# Patient Record
Sex: FEMALE | Race: WHITE | NOT HISPANIC OR LATINO | Employment: FULL TIME | URBAN - METROPOLITAN AREA
[De-identification: names, ages, dates, MRNs, and addresses within clinical notes are randomized per-mention and may not be internally consistent; named-entity substitution may affect disease eponyms.]

---

## 2017-02-12 ENCOUNTER — COMMUNICATION - HEALTHEAST (OUTPATIENT)
Dept: FAMILY MEDICINE | Facility: CLINIC | Age: 32
End: 2017-02-12

## 2017-02-13 ENCOUNTER — COMMUNICATION - HEALTHEAST (OUTPATIENT)
Dept: TELEHEALTH | Facility: CLINIC | Age: 32
End: 2017-02-13

## 2017-02-13 ENCOUNTER — OFFICE VISIT - HEALTHEAST (OUTPATIENT)
Dept: FAMILY MEDICINE | Facility: CLINIC | Age: 32
End: 2017-02-13

## 2017-02-13 DIAGNOSIS — N80.9 ENDOMETRIOSIS: ICD-10-CM

## 2017-02-13 DIAGNOSIS — E66.9 OBESITY, UNSPECIFIED: ICD-10-CM

## 2017-02-13 DIAGNOSIS — F41.1 GENERALIZED ANXIETY DISORDER: ICD-10-CM

## 2017-02-13 DIAGNOSIS — R63.5 WEIGHT GAIN: ICD-10-CM

## 2017-02-13 ASSESSMENT — MIFFLIN-ST. JEOR: SCORE: 1739.62

## 2017-02-14 ENCOUNTER — COMMUNICATION - HEALTHEAST (OUTPATIENT)
Dept: SCHEDULING | Facility: CLINIC | Age: 32
End: 2017-02-14

## 2017-02-14 DIAGNOSIS — F41.9 ANXIETY: ICD-10-CM

## 2017-03-22 ENCOUNTER — COMMUNICATION - HEALTHEAST (OUTPATIENT)
Dept: SCHEDULING | Facility: CLINIC | Age: 32
End: 2017-03-22

## 2017-03-22 DIAGNOSIS — F41.9 ANXIETY: ICD-10-CM

## 2017-03-31 ENCOUNTER — COMMUNICATION - HEALTHEAST (OUTPATIENT)
Dept: SCHEDULING | Facility: CLINIC | Age: 32
End: 2017-03-31

## 2017-04-25 ENCOUNTER — RECORDS - HEALTHEAST (OUTPATIENT)
Dept: ADMINISTRATIVE | Facility: OTHER | Age: 32
End: 2017-04-25

## 2017-05-02 ENCOUNTER — OFFICE VISIT - HEALTHEAST (OUTPATIENT)
Dept: FAMILY MEDICINE | Facility: CLINIC | Age: 32
End: 2017-05-02

## 2017-05-02 DIAGNOSIS — B02.9 HERPES ZOSTER: ICD-10-CM

## 2017-05-05 ENCOUNTER — COMMUNICATION - HEALTHEAST (OUTPATIENT)
Dept: FAMILY MEDICINE | Facility: CLINIC | Age: 32
End: 2017-05-05

## 2017-05-22 ENCOUNTER — COMMUNICATION - HEALTHEAST (OUTPATIENT)
Dept: SCHEDULING | Facility: CLINIC | Age: 32
End: 2017-05-22

## 2017-05-22 DIAGNOSIS — F41.9 ANXIETY: ICD-10-CM

## 2017-05-24 ENCOUNTER — COMMUNICATION - HEALTHEAST (OUTPATIENT)
Dept: SCHEDULING | Facility: CLINIC | Age: 32
End: 2017-05-24

## 2017-05-24 DIAGNOSIS — F41.9 ANXIETY: ICD-10-CM

## 2017-07-14 ENCOUNTER — COMMUNICATION - HEALTHEAST (OUTPATIENT)
Dept: SCHEDULING | Facility: CLINIC | Age: 32
End: 2017-07-14

## 2017-07-14 DIAGNOSIS — F41.9 ANXIETY: ICD-10-CM

## 2017-07-30 ENCOUNTER — RECORDS - HEALTHEAST (OUTPATIENT)
Dept: ADMINISTRATIVE | Facility: OTHER | Age: 32
End: 2017-07-30

## 2017-08-08 ENCOUNTER — RECORDS - HEALTHEAST (OUTPATIENT)
Dept: ADMINISTRATIVE | Facility: OTHER | Age: 32
End: 2017-08-08

## 2017-08-16 ENCOUNTER — RECORDS - HEALTHEAST (OUTPATIENT)
Dept: ADMINISTRATIVE | Facility: OTHER | Age: 32
End: 2017-08-16

## 2017-09-25 ENCOUNTER — COMMUNICATION - HEALTHEAST (OUTPATIENT)
Dept: SCHEDULING | Facility: CLINIC | Age: 32
End: 2017-09-25

## 2017-09-25 DIAGNOSIS — F41.9 ANXIETY: ICD-10-CM

## 2017-12-25 ENCOUNTER — COMMUNICATION - HEALTHEAST (OUTPATIENT)
Dept: FAMILY MEDICINE | Facility: CLINIC | Age: 32
End: 2017-12-25

## 2018-10-17 ENCOUNTER — OFFICE VISIT (OUTPATIENT)
Dept: URGENT CARE | Facility: URGENT CARE | Age: 33
End: 2018-10-17

## 2018-10-17 VITALS
RESPIRATION RATE: 15 BRPM | DIASTOLIC BLOOD PRESSURE: 82 MMHG | WEIGHT: 225 LBS | SYSTOLIC BLOOD PRESSURE: 120 MMHG | HEART RATE: 70 BPM | BODY MASS INDEX: 39.87 KG/M2 | HEIGHT: 63 IN | TEMPERATURE: 98.5 F

## 2018-10-17 DIAGNOSIS — L03.012 PARONYCHIA OF THUMB, LEFT: Primary | ICD-10-CM

## 2018-10-17 PROCEDURE — 99203 OFFICE O/P NEW LOW 30 MIN: CPT | Performed by: INTERNAL MEDICINE

## 2018-10-17 RX ORDER — NORETHINDRONE ACETATE AND ETHINYL ESTRADIOL AND FERROUS FUMARATE 5-7-9-7
1 KIT ORAL DAILY
COMMUNITY

## 2018-10-17 RX ORDER — MUPIROCIN 20 MG/G
OINTMENT TOPICAL 3 TIMES DAILY
Qty: 22 G | Refills: 0 | Status: SHIPPED | OUTPATIENT
Start: 2018-10-17 | End: 2018-10-22

## 2018-10-17 RX ORDER — CEPHALEXIN 500 MG/1
500 CAPSULE ORAL 3 TIMES DAILY
Qty: 21 CAPSULE | Refills: 0 | Status: SHIPPED | OUTPATIENT
Start: 2018-10-17 | End: 2018-10-24

## 2018-10-17 ASSESSMENT — ENCOUNTER SYMPTOMS: FEVER: 0

## 2018-10-17 NOTE — MR AVS SNAPSHOT
After Visit Summary   10/17/2018    Awilda Hloley    MRN: [id number]           Patient Information     Date Of Birth          1985        Visit Information        Provider Department      10/17/2018 8:35 PM Sammie Finch MD [facility]        Today's Diagnoses     Paronychia of thumb, left    -  1      Care Instructions            Paronychia of the Finger or Toe  Paronychia is an infection near a fingernail or toenail. It usually occurs when an opening in the cuticle or an ingrown toenail lets bacteria under the skin.  The infection will need to be drained if pus is present. If the infection has been caught early, you may need only antibiotic treatment. Healing will take about 1 to 2 weeks.  Home care  Follow these guidelines when caring for yourself at home:    Clean and soak the toe or finger. Do this 2 times a day for the first 3 days. To do so:  ? Soak your foot or hand in a tub of warm water for 5 minutes. Or hold your toe or finger under a faucet of warm running water for 5 minutes.  ? Clean any crust away with soap and water using a cotton swab.  ? Put antibiotic ointment on the infected area.    Change the dressing daily or any time it gets dirty.    If you were given antibiotics, take them as directed until they are all gone.    If your infection is on a toe, wear comfortable shoes with a lot of toe room. You can also wear open-toed sandals while your toe heals.    You may use over-the-counter medicine (acetaminophen or ibuprofen to help with pain, unless another medicine was prescribed. If you have chronic liver or kidney disease, talk with your healthcare provider before using these medicines. Also talk with your provider if you've had a stomach ulcer or GI (gastrointestinal) bleeding.  Prevention  The following can prevent paronychia:    Avoid cutting or playing with your cuticles at home.    Don't bite your nails.    Don't suck on your thumbs or  "fingers.  Follow-up care  Follow up with your healthcare provider, or as advised.  When to seek medical advice  Call your healthcare provider right away if any of these occur:    Redness, pain, or swelling of the finger or toe gets worse    Red streaks in the skin leading away from the wound    Pus or fluid draining from the nail area    Fever of 100.4 F (38 C) or higher, or as directed by your provider  Date Last Reviewed: 8/1/2016 2000-2017 [employer]. [address] [place] [address]. All rights reserved. This information is not intended as a substitute for professional medical care. Always follow your healthcare professional's instructions.                Follow-ups after your visit        Who to contact     If you have questions or need follow up information about today's clinic visit or your schedule please contact [facility] directly at [phone].  Normal or non-critical lab and imaging results will be communicated to you by MyChart, letter or phone within 4 business days after the clinic has received the results. If you do not hear from us within 7 days, please contact the clinic through MyChart or phone. If you have a critical or abnormal lab result, we will notify you by phone as soon as possible.  Submit refill requests through [email or web address] or call your pharmacy and they will forward the refill request to us. Please allow 3 business days for your refill to be completed.          Additional Information About Your Visit        Care EveryWhere ID     This is your Care EveryWhere ID. This could be used by other organizations to access your [facility] medical records  [id number]        Your Vitals Were     Pulse Temperature Respirations Height Last Period Breastfeeding?    70 98.5  F (36.9  C) (Oral) 15 5' 3\" (1.6 m) 09/26/2018 No    BMI (Body Mass Index)                   39.86 kg/m2            Blood Pressure from Last 3 Encounters:   10/17/18 120/82    Weight " from Last 3 Encounters:   10/17/18 225 lb (102.1 kg)              Today, you had the following     No orders found for display         Today's Medication Changes          These changes are accurate as of 10/17/18  9:01 PM.  If you have any questions, ask your nurse or doctor.               Start taking these medicines.        Dose/Directions    cephALEXin 500 MG capsule   Commonly known as:  KEFLEX   Used for:  Paronychia of thumb, left   Started by:  Sammie Finch MD        Dose:  500 mg   Take 1 capsule (500 mg) by mouth 3 times daily for 7 days   Quantity:  21 capsule   Refills:  0       mupirocin 2 % ointment   Commonly known as:  BACTROBAN   Used for:  Paronychia of thumb, left   Started by:  Sammie Finch MD        Apply topically 3 times daily for 5 days   Quantity:  22 g   Refills:  0            Where to get your medicines      Some of these will need a paper prescription and others can be bought over the counter.  Ask your nurse if you have questions.     Bring a paper prescription for each of these medications     cephALEXin 500 MG capsule    mupirocin 2 % ointment                Primary Care Provider    Provider Not In System                Equal Access to Services     LAVONNE HIRSCH : Hadjose rafael Ackerman, brent levin, remigio santillan, herminio guerrero. [facility] [phone].    ATENCIÓN: Si habla español, tiene a horton disposición servicios gratmarkellos de asistencia lingüística. Llame al [phone].    We comply with applicable federal civil rights laws and [place] laws. We do not discriminate on the basis of race, color, national origin, age, disability, sex, sexual orientation, or gender identity.            Thank you!     Thank you for choosing [facility]  for your care. Our goal is always to provide you with excellent care. Hearing back from our patients is one way we can continue to improve our services. Please  take a few minutes to complete the written survey that you may receive in the mail after your visit with us. Thank you!             Your Updated Medication List - Protect others around you: Learn how to safely use, store and throw away your medicines at www.disposemymeds.org.          This list is accurate as of 10/17/18  9:01 PM.  Always use your most recent med list.                   Brand Name Dispense Instructions for use Diagnosis    cephALEXin 500 MG capsule    KEFLEX    21 capsule    Take 1 capsule (500 mg) by mouth 3 times daily for 7 days    Paronychia of thumb, left       mupirocin 2 % ointment    BACTROBAN    22 g    Apply topically 3 times daily for 5 days    Paronychia of thumb, left       norethindrone-ethinyl estradiol-iron 1-20/1-30/1-35 MG-MCG per tablet    ESTROSTEP FE     Take 1 tablet by mouth daily

## 2018-10-18 NOTE — PROGRESS NOTES
"SUBJECTIVE:   Awilda Holley is a 33 year old female presenting with a chief complaint of   Chief Complaint   Patient presents with     Urgent Care     ingrown fingernail     concern of ingrown fingernail in left thumb. Very painful. feels hot.        She is a new patient of [facility].        Onset of symptoms was 5 day(s) ago.  Course of illness is worsening.      Location: Left thumb  Context: Patient used to cuticle clipper to cut off some skin on her left thumb 5 days ago as she was having some pain.  Current and Associated symptoms: redness, swelling, pain and bump started to develop  Painful to touch.  Treatment measures tried include: ice and soaking her thumb  Relief from treatment: none    Review of Systems   Constitutional: Negative for fever.       Past Medical History:   Diagnosis Date     NO ACTIVE PROBLEMS      No family history on file.  Current Outpatient Prescriptions   Medication Sig Dispense Refill     cephALEXin (KEFLEX) 500 MG capsule Take 1 capsule (500 mg) by mouth 3 times daily for 7 days 21 capsule 0     mupirocin (BACTROBAN) 2 % ointment Apply topically 3 times daily for 5 days 22 g 0     norethindrone-ethinyl estradiol-iron (ESTROSTEP FE) 1-20/1-30/1-35 MG-MCG per tablet Take 1 tablet by mouth daily       Social History   Substance Use Topics     Smoking status: Never Smoker     Smokeless tobacco: Never Used     Alcohol use Not on file       OBJECTIVE  /82  Pulse 70  Temp 98.5  F (36.9  C) (Oral)  Resp 15  Ht 5' 3\" (1.6 m)  Wt 225 lb (102.1 kg)  LMP 09/26/2018  Breastfeeding? No  BMI 39.86 kg/m2    Physical Exam   Constitutional: She appears well-developed and well-nourished.   Musculoskeletal:   Left thumb-medial left thumb nail is red swollen and tender without significant discharge.  There is no purulence or fluctuance noted   Vitals reviewed.      Labs:  No results found for this or any previous visit (from the past 24 hour(s)).        ASSESSMENT:      ICD-10-CM    1. " Paronychia of thumb, left L03.012 mupirocin (BACTROBAN) 2 % ointment     cephALEXin (KEFLEX) 500 MG capsule        PLAN:  Bactroban 3 times a day.  Soaking   if not improving fill prescription for oral antibiotics Keflex      Followup:    If not improving or if condition worsens, follow up with your Primary Care Provider    Patient Instructions           Paronychia of the Finger or Toe  Paronychia is an infection near a fingernail or toenail. It usually occurs when an opening in the cuticle or an ingrown toenail lets bacteria under the skin.  The infection will need to be drained if pus is present. If the infection has been caught early, you may need only antibiotic treatment. Healing will take about 1 to 2 weeks.  Home care  Follow these guidelines when caring for yourself at home:    Clean and soak the toe or finger. Do this 2 times a day for the first 3 days. To do so:  ? Soak your foot or hand in a tub of warm water for 5 minutes. Or hold your toe or finger under a faucet of warm running water for 5 minutes.  ? Clean any crust away with soap and water using a cotton swab.  ? Put antibiotic ointment on the infected area.    Change the dressing daily or any time it gets dirty.    If you were given antibiotics, take them as directed until they are all gone.    If your infection is on a toe, wear comfortable shoes with a lot of toe room. You can also wear open-toed sandals while your toe heals.    You may use over-the-counter medicine (acetaminophen or ibuprofen to help with pain, unless another medicine was prescribed. If you have chronic liver or kidney disease, talk with your healthcare provider before using these medicines. Also talk with your provider if you've had a stomach ulcer or GI (gastrointestinal) bleeding.  Prevention  The following can prevent paronychia:    Avoid cutting or playing with your cuticles at home.    Don't bite your nails.    Don't suck on your thumbs or fingers.  Follow-up care  Follow  up with your healthcare provider, or as advised.  When to seek medical advice  Call your healthcare provider right away if any of these occur:    Redness, pain, or swelling of the finger or toe gets worse    Red streaks in the skin leading away from the wound    Pus or fluid draining from the nail area    Fever of 100.4 F (38 C) or higher, or as directed by your provider  Date Last Reviewed: 8/1/2016 2000-2017 [employer]. [address] [place] [address]. All rights reserved. This information is not intended as a substitute for professional medical care. Always follow your healthcare professional's instructions.

## 2018-10-18 NOTE — PATIENT INSTRUCTIONS
Paronychia of the Finger or Toe  Paronychia is an infection near a fingernail or toenail. It usually occurs when an opening in the cuticle or an ingrown toenail lets bacteria under the skin.  The infection will need to be drained if pus is present. If the infection has been caught early, you may need only antibiotic treatment. Healing will take about 1 to 2 weeks.  Home care  Follow these guidelines when caring for yourself at home:    Clean and soak the toe or finger. Do this 2 times a day for the first 3 days. To do so:  ? Soak your foot or hand in a tub of warm water for 5 minutes. Or hold your toe or finger under a faucet of warm running water for 5 minutes.  ? Clean any crust away with soap and water using a cotton swab.  ? Put antibiotic ointment on the infected area.    Change the dressing daily or any time it gets dirty.    If you were given antibiotics, take them as directed until they are all gone.    If your infection is on a toe, wear comfortable shoes with a lot of toe room. You can also wear open-toed sandals while your toe heals.    You may use over-the-counter medicine (acetaminophen or ibuprofen to help with pain, unless another medicine was prescribed. If you have chronic liver or kidney disease, talk with your healthcare provider before using these medicines. Also talk with your provider if you've had a stomach ulcer or GI (gastrointestinal) bleeding.  Prevention  The following can prevent paronychia:    Avoid cutting or playing with your cuticles at home.    Don't bite your nails.    Don't suck on your thumbs or fingers.  Follow-up care  Follow up with your healthcare provider, or as advised.  When to seek medical advice  Call your healthcare provider right away if any of these occur:    Redness, pain, or swelling of the finger or toe gets worse    Red streaks in the skin leading away from the wound    Pus or fluid draining from the nail area    Fever of 100.4 F (38 C) or higher, or as  directed by your provider  Date Last Reviewed: 8/1/2016 2000-2017 [employer]. [address] [place] [address], [place] [address]. All rights reserved. This information is not intended as a substitute for professional medical care. Always follow your healthcare professional's instructions.

## 2021-05-29 ENCOUNTER — RECORDS - HEALTHEAST (OUTPATIENT)
Dept: ADMINISTRATIVE | Facility: CLINIC | Age: 36
End: 2021-05-29

## 2021-05-30 ENCOUNTER — RECORDS - HEALTHEAST (OUTPATIENT)
Dept: ADMINISTRATIVE | Facility: CLINIC | Age: 36
End: 2021-05-30

## 2021-05-30 VITALS — BODY MASS INDEX: 41.22 KG/M2 | WEIGHT: 232.7 LBS

## 2021-05-30 VITALS — HEIGHT: 63 IN | WEIGHT: 236 LBS | BODY MASS INDEX: 41.82 KG/M2

## 2021-05-31 ENCOUNTER — RECORDS - HEALTHEAST (OUTPATIENT)
Dept: ADMINISTRATIVE | Facility: CLINIC | Age: 36
End: 2021-05-31

## 2021-06-01 ENCOUNTER — RECORDS - HEALTHEAST (OUTPATIENT)
Dept: ADMINISTRATIVE | Facility: CLINIC | Age: 36
End: 2021-06-01

## 2021-06-02 ENCOUNTER — RECORDS - HEALTHEAST (OUTPATIENT)
Dept: ADMINISTRATIVE | Facility: CLINIC | Age: 36
End: 2021-06-02

## 2021-06-08 NOTE — PROGRESS NOTES
"Awilda Holley is a 31-year-old with a history of anxiety and depression obesity with endometriosis and dysmenorrhea since today for follow-up.  She continues to have a fair amount of depression/anxiety and is seeing a counselor regularly.  There has been some debate as to whether she has PTSD or not.  She continues to use the alprazolam once or twice a week and usually 30 tablets last her about 4 months.  We discussed the idea of starting a regular daily antianxiety medication and she declines.    She has continued to struggle with her endometriosis and dysmenorrhea.  She is in the process of changing to a different gynecologist and is now seeing [facility].  She would like a refill on her birth control pills that she uses as a suppression to her endometriosis.  This seems to work fairly well for her as long as she does the three-month cycling.  She has been told that she may need surgery.  She is hoping to avoid that as she is hoping to maintain her fertility.  She is concerned about further scarring.    We reviewed heart disease prevention and cancer detection.  Her weight is up and I encouraged from regular exercise.  We discussed the idea of doing further lab tests to see if there is anything more going on from a physical nature.    Objective:  Visit Vitals     /68 (Patient Site: Left Arm, Patient Position: Sitting, Cuff Size: Adult Regular)     Pulse 64     Temp 98  F (36.7  C) (Oral)     Resp 16     Ht 5' 3\" (1.6 m)     Wt (!) 236 lb (107 kg)     BMI 41.81 kg/m2     Neck is supple without lymphadenopathy thyromegaly or bruits lungs are clear heart with a regular rate and rhythm without murmur rub gallop normal S1-S2 lower extremities are free of edema skin appears normal abdomen is soft nontender obese bowel sounds positive  Please see PH Q9 and DANY forms    Labs ordered    Assessment: Anxiety/depression, endometriosis with obesity weight cane    Plan: We will call her with labs when available given a " refill on her medication and I encouraged her to continue her counseling and follow through with her gynecologist and follow-up here otherwise in 6 months or as needed

## 2021-06-10 NOTE — PROGRESS NOTES
Assessment:     1. Herpes zoster            Plan:     Herpes zoster lesions healing nicely.  She has completed a course of Valtrex.  Anticipate she will continue to feel better in terms of the pain.  Continue ibuprofen as needed for discomfort.  No additional pain medication given today.  Follow-up with her primary care physician if getting worse or not improving.    Subjective:       31 y.o. female presents for follow-up of the zoster.  She was evaluated at [facility] in [place] on 4/25/2017 for a rash on her abdomen.  She was diagnosed with shingles.  She had a lot of oozing and drainage from the shingles lesions.  She was started on valacyclovir and given some pain medication and advised to take ibuprofen as well.  She is also using some topical hydrocortisone cream and ibuprofen to the lesions.  She was told to follow-up in 1 week for recheck of how she is doing but was unable to get in with her primary care clinic.  She has been taking ibuprofen regularly for the discomfort and did take a couple of pain medications early on in her course at night to help with sleep.  She just took some ibuprofen last night and did have some difficulty with sleeping due to the discomfort.  Overall her fatigue and headache is gotten better and the rash has significantly improved.    The following portions of the patient's history were reviewed and updated as appropriate: allergies, current medications, past family history, past medical history, past social history, past surgical history and problem list.    Review of Systems  A 12 point comprehensive review of systems was negative except as noted.     Objective:      /78  Pulse 87  Temp 98.6  F (37  C) (Oral)   Wt (!) 232 lb 11.2 oz (105.6 kg)  SpO2 96%  BMI 41.22 kg/m2  General appearance: alert, appears stated age and cooperative  Abdomen: Herpes zoster rash significantly improved in fact is almost completely resolved.  no evidence of infection.    This note  has been dictated using voice recognition software. Any grammatical or context distortions are unintentional and inherent to the software

## 2022-01-13 ENCOUNTER — HOSPITAL ENCOUNTER (EMERGENCY)
Facility: CLINIC | Age: 37
Discharge: HOME | End: 2022-01-14
Attending: EMERGENCY MEDICINE | Admitting: EMERGENCY MEDICINE

## 2022-01-13 ENCOUNTER — APPOINTMENT (OUTPATIENT)
Dept: ULTRASOUND IMAGING | Facility: CLINIC | Age: 37
End: 2022-01-13
Attending: EMERGENCY MEDICINE

## 2022-01-13 DIAGNOSIS — L53.9 LEG ERYTHEMA: ICD-10-CM

## 2022-01-13 DIAGNOSIS — M79.89 LEG SWELLING: ICD-10-CM

## 2022-01-13 LAB
ALBUMIN SERPL-MCNC: 3.8 G/DL (ref 3.5–5)
ALP SERPL-CCNC: 61 U/L (ref 45–120)
ALT SERPL W P-5'-P-CCNC: 45 U/L (ref 0–45)
ANION GAP SERPL CALCULATED.3IONS-SCNC: 10 MMOL/L (ref 5–18)
AST SERPL W P-5'-P-CCNC: 39 U/L (ref 0–40)
BASOPHILS # BLD AUTO: 0 10E3/UL (ref 0–0.2)
BASOPHILS NFR BLD AUTO: 0 %
BILIRUB SERPL-MCNC: 0.3 MG/DL (ref 0–1)
BUN SERPL-MCNC: 10 MG/DL (ref 8–22)
C REACTIVE PROTEIN LHE: 1.4 MG/DL (ref 0–0.8)
CALCIUM SERPL-MCNC: 9.3 MG/DL (ref 8.5–10.5)
CHLORIDE BLD-SCNC: 102 MMOL/L (ref 98–107)
CO2 SERPL-SCNC: 28 MMOL/L (ref 22–31)
CREAT SERPL-MCNC: 0.66 MG/DL (ref 0.6–1.1)
EOSINOPHIL # BLD AUTO: 0.3 10E3/UL (ref 0–0.7)
EOSINOPHIL NFR BLD AUTO: 4 %
ERYTHROCYTE [DISTWIDTH] IN BLOOD BY AUTOMATED COUNT: 16.5 % (ref 10–15)
ERYTHROCYTE [SEDIMENTATION RATE] IN BLOOD BY WESTERGREN METHOD: 28 MM/HR (ref 0–20)
GFR SERPL CREATININE-BSD FRML MDRD: >90 ML/MIN/1.73M2
GLUCOSE BLD-MCNC: 125 MG/DL (ref 70–125)
HCT VFR BLD AUTO: 34 % (ref 35–47)
HGB BLD-MCNC: 10.1 G/DL (ref 11.7–15.7)
IMM GRANULOCYTES # BLD: 0 10E3/UL
IMM GRANULOCYTES NFR BLD: 0 %
LYMPHOCYTES # BLD AUTO: 2.4 10E3/UL (ref 0.8–5.3)
LYMPHOCYTES NFR BLD AUTO: 31 %
MCH RBC QN AUTO: 22.6 PG (ref 26.5–33)
MCHC RBC AUTO-ENTMCNC: 29.7 G/DL (ref 31.5–36.5)
MCV RBC AUTO: 76 FL (ref 78–100)
MONOCYTES # BLD AUTO: 0.6 10E3/UL (ref 0–1.3)
MONOCYTES NFR BLD AUTO: 7 %
NEUTROPHILS # BLD AUTO: 4.4 10E3/UL (ref 1.6–8.3)
NEUTROPHILS NFR BLD AUTO: 58 %
NRBC # BLD AUTO: 0 10E3/UL
NRBC BLD AUTO-RTO: 0 /100
PLATELET # BLD AUTO: 365 10E3/UL (ref 150–450)
POTASSIUM BLD-SCNC: 3.7 MMOL/L (ref 3.5–5)
PROT SERPL-MCNC: 6.8 G/DL (ref 6–8)
RBC # BLD AUTO: 4.46 10E6/UL (ref 3.8–5.2)
SODIUM SERPL-SCNC: 140 MMOL/L (ref 136–145)
WBC # BLD AUTO: 7.7 10E3/UL (ref 4–11)

## 2022-01-13 PROCEDURE — 86140 C-REACTIVE PROTEIN: CPT | Performed by: EMERGENCY MEDICINE

## 2022-01-13 PROCEDURE — 85652 RBC SED RATE AUTOMATED: CPT | Performed by: EMERGENCY MEDICINE

## 2022-01-13 PROCEDURE — 99285 EMERGENCY DEPT VISIT HI MDM: CPT | Mod: 25

## 2022-01-13 PROCEDURE — 82040 ASSAY OF SERUM ALBUMIN: CPT | Performed by: EMERGENCY MEDICINE

## 2022-01-13 PROCEDURE — 250N000011 HC RX IP 250 OP 636: Performed by: EMERGENCY MEDICINE

## 2022-01-13 PROCEDURE — 36415 COLL VENOUS BLD VENIPUNCTURE: CPT | Performed by: EMERGENCY MEDICINE

## 2022-01-13 PROCEDURE — 80053 COMPREHEN METABOLIC PANEL: CPT | Performed by: EMERGENCY MEDICINE

## 2022-01-13 PROCEDURE — 93970 EXTREMITY STUDY: CPT

## 2022-01-13 PROCEDURE — 96374 THER/PROPH/DIAG INJ IV PUSH: CPT | Mod: 59

## 2022-01-13 PROCEDURE — 85025 COMPLETE CBC W/AUTO DIFF WBC: CPT | Performed by: EMERGENCY MEDICINE

## 2022-01-13 RX ORDER — KETOROLAC TROMETHAMINE 15 MG/ML
15 INJECTION, SOLUTION INTRAMUSCULAR; INTRAVENOUS ONCE
Status: COMPLETED | OUTPATIENT
Start: 2022-01-13 | End: 2022-01-13

## 2022-01-13 RX ADMIN — KETOROLAC TROMETHAMINE 15 MG: 15 INJECTION, SOLUTION INTRAMUSCULAR; INTRAVENOUS at 22:28

## 2022-01-13 ASSESSMENT — ENCOUNTER SYMPTOMS
COLOR CHANGE: 1
MYALGIAS: 1
CONSTIPATION: 0
DIARRHEA: 0
SHORTNESS OF BREATH: 0
ROS SKIN COMMENTS: POSITIVE FOR ITCHING

## 2022-01-14 ENCOUNTER — APPOINTMENT (OUTPATIENT)
Dept: CT IMAGING | Facility: CLINIC | Age: 37
End: 2022-01-14
Attending: EMERGENCY MEDICINE

## 2022-01-14 VITALS
TEMPERATURE: 98.6 F | HEART RATE: 92 BPM | SYSTOLIC BLOOD PRESSURE: 144 MMHG | RESPIRATION RATE: 20 BRPM | DIASTOLIC BLOOD PRESSURE: 67 MMHG | OXYGEN SATURATION: 95 % | BODY MASS INDEX: 53.14 KG/M2 | WEIGHT: 293 LBS

## 2022-01-14 LAB
ALBUMIN UR-MCNC: 10 MG/DL
APPEARANCE UR: ABNORMAL
BACTERIA #/AREA URNS HPF: ABNORMAL /HPF
BILIRUB UR QL STRIP: NEGATIVE
COLOR UR AUTO: YELLOW
GLUCOSE UR STRIP-MCNC: NEGATIVE MG/DL
HCG SERPL QL: NEGATIVE
HCG UR QL: NEGATIVE
HGB UR QL STRIP: NEGATIVE
INTERNAL QC OK POCT: NORMAL
KETONES UR STRIP-MCNC: NEGATIVE MG/DL
LEUKOCYTE ESTERASE UR QL STRIP: NEGATIVE
MUCOUS THREADS #/AREA URNS LPF: PRESENT /LPF
NITRATE UR QL: NEGATIVE
PH UR STRIP: 6 [PH] (ref 5–7)
POCT KIT EXPIRATION DATE: NORMAL
POCT KIT LOT NUMBER: NORMAL
RBC URINE: 1 /HPF
SP GR UR STRIP: 1.03 (ref 1–1.03)
SQUAMOUS EPITHELIAL: 12 /HPF
UROBILINOGEN UR STRIP-MCNC: <2 MG/DL
WBC URINE: 0 /HPF

## 2022-01-14 PROCEDURE — 81025 URINE PREGNANCY TEST: CPT | Performed by: EMERGENCY MEDICINE

## 2022-01-14 PROCEDURE — 250N000011 HC RX IP 250 OP 636: Performed by: EMERGENCY MEDICINE

## 2022-01-14 PROCEDURE — 74177 CT ABD & PELVIS W/CONTRAST: CPT

## 2022-01-14 PROCEDURE — 84703 CHORIONIC GONADOTROPIN ASSAY: CPT | Performed by: EMERGENCY MEDICINE

## 2022-01-14 PROCEDURE — 36415 COLL VENOUS BLD VENIPUNCTURE: CPT | Performed by: EMERGENCY MEDICINE

## 2022-01-14 PROCEDURE — 81001 URINALYSIS AUTO W/SCOPE: CPT | Performed by: EMERGENCY MEDICINE

## 2022-01-14 RX ORDER — IOPAMIDOL 755 MG/ML
100 INJECTION, SOLUTION INTRAVASCULAR ONCE
Status: COMPLETED | OUTPATIENT
Start: 2022-01-14 | End: 2022-01-14

## 2022-01-14 RX ADMIN — IOPAMIDOL 100 ML: 755 INJECTION, SOLUTION INTRAVENOUS at 01:15

## 2022-01-14 NOTE — ED PROVIDER NOTES
EMERGENCY DEPARTMENT ENCOUNTER      NAME: Awilda Holley  AGE: 36 year old female  YOB: 1985  MRN: [id number]  EVALUATION DATE & TIME: 1/13/2022  9:56 PM    PCP: No primary care provider on file.    ED PROVIDER: Shade Warren M.D.      Chief Complaint   Patient presents with     Leg Swelling         FINAL IMPRESSION:  1. Leg swelling    2. Leg erythema          ED COURSE & MEDICAL DECISION MAKING:    Pertinent Labs & Imaging studies reviewed. (See chart for details)  36 year old female presents to the Emergency Department for evaluation of bilateral leg swelling and redness.  Patient recently on antibiotics for tooth infection.  Was on clindamycin and Levaquin.  Now having worsening leg swelling pain and redness.  They are not particularly warm.  Seems unlikely cellulitis.  To do ultrasounds no signs of DVT.  Had some abdominal pains also did a CT scan looking in the abdomen for any signs of obstruction of her venous return.  None is seen.  No other cause of her pain is seen.  Patient has a normal white count.  Mildly elevated sed rate and CRP.  Normal kidney and liver function.  Mild amount of protein in her urine of unclear significance.  I do not think this is Jerome-Javed syndrome.  She has no blistering or open wounds.  Does not seem to be consistent with that.  However we will have her follow-up with her primary in the next few days.  Return for any worsening symptoms.  She is advised to elevate her legs use ibuprofen for pain addition to compression stockings.  Patient states understanding and is discharged home.    10:05 PM I met with the patient to gather history and to perform my initial exam. I discussed the plan for care while in the Emergency Department. PPE: Provider wore gloves, mask, eye protection, and paper mask.      At the conclusion of the encounter I discussed the results of all of the tests and the disposition. The questions were answered. The patient or family acknowledged  understanding and was agreeable with the care plan.           MEDICATIONS GIVEN IN THE EMERGENCY:  Medications   ketorolac (TORADOL) injection 15 mg (15 mg Intravenous Given 1/13/22 2228)   iopamidol (ISOVUE-370) solution 100 mL (100 mLs Intravenous Given 1/14/22 0115)       NEW PRESCRIPTIONS STARTED AT TODAY'S ER VISIT  New Prescriptions    No medications on file          =================================================================    HPI    Patient information was obtained from: The patient    Use of : N/A       Awilda Holley is a 36 year old female with a pertinent history of generalized anxiety disorder who presents to this ED via walk-in for evaluation of leg swelling and pain.    The patient reports that 3 days ago she developed pain to her bilateral legs with associated swelling, erythema, itching, and warmth to the touch. Since onset her pain has progressively worsened. She also notes some pain to her suprapubic area.     Of note, slightly over a month ago the patient had her wisdom tooth pulled and it turned out to be slightly complicated and she would need to see an oral surgeon. Then, around [place] (~20 days ago) while she was in [place] she developed an infection and had to be hospitalized. While hospitalized she also contracted COVID-19. The patient was put on antibiotics and just finished her course the day before her leg symptoms began. She was taking Clindamycin and Levaquin. The patient denies any regular daily medications. Denies any chance of pregnancy. Denies diarrhea, constipation, chest pain, shortness of breath, and any other symptoms or complaints at this time.       REVIEW OF SYSTEMS   Review of Systems   Respiratory: Negative for shortness of breath.    Cardiovascular: Positive for leg swelling. Negative for chest pain.   Gastrointestinal: Negative for constipation and diarrhea.   Musculoskeletal: Positive for myalgias.   Skin: Positive for color change (redness).         Positive for itching   All other systems reviewed and are negative.       PAST MEDICAL HISTORY:  Past Medical History:   Diagnosis Date     NO ACTIVE PROBLEMS        PAST SURGICAL HISTORY:  Past Surgical History:   Procedure Laterality Date     SD LAP,ABDOMEN,ASPIRATE CYST      Description: Gynecologic Laparoscopy With Aspiration;  Proc Date: 2009;  Comments: with removal ovarian cysts     [facility]  DELIVERY ONLY      Description:  Section Low Transverse;  Proc Date: 2007;     ZZC LAP,CHOLECYSTECTOMY/EXPLORE      Description: Cholecystectomy Laparoscopic;  Proc Date: 2009;           CURRENT MEDICATIONS:    No current facility-administered medications for this encounter.     Current Outpatient Medications   Medication     norethindrone-ethinyl estradiol-iron (ESTROSTEP FE) 1-20/1-30/1-35 MG-MCG per tablet         ALLERGIES:  Allergies   Allergen Reactions     Pcn [Penicillins]      Tylenol [Acetaminophen]        FAMILY HISTORY:  History reviewed. No pertinent family history.    SOCIAL HISTORY:   Social History     Socioeconomic History     Marital status: Single     Spouse name: Not on file     Number of children: Not on file     Years of education: Not on file     Highest education level: Not on file   Occupational History     Not on file   Tobacco Use     Smoking status: Never Smoker     Smokeless tobacco: Never Used   Substance and Sexual Activity     Alcohol use: Not on file     Drug use: Not on file     Sexual activity: Not on file   Other Topics Concern     Not on file   Social History Narrative     Not on file     Social Determinants of Health     Financial Resource Strain: Not on file   Food Insecurity: Not on file   Transportation Needs: Not on file   Physical Activity: Not on file   Stress: Not on file   Social Connections: Not on file   Intimate Partner Violence: Not on file   Housing Stability: Not on file       VITALS:  BP (!) 144/67   Pulse 92   Temp 98.6  F (37  C)  (Oral)   Resp 20   Wt 136.1 kg (300 lb)   SpO2 95%   BMI 53.14 kg/m      PHYSICAL EXAM    Physical Exam  Constitutional:       General: She is not in acute distress.     Appearance: She is not diaphoretic.   HENT:      Head: Atraumatic.      Mouth/Throat:      Pharynx: No oropharyngeal exudate.   Eyes:      General: No scleral icterus.     Pupils: Pupils are equal, round, and reactive to light.   Cardiovascular:      Heart sounds: Normal heart sounds.   Pulmonary:      Effort: No respiratory distress.      Breath sounds: Normal breath sounds.   Abdominal:      Palpations: Abdomen is soft.      Tenderness: There is abdominal tenderness (mid lower abdomen). There is no guarding or rebound.   Musculoskeletal:         General: No tenderness.      Right lower leg: Edema present.      Left lower leg: Edema present.   Skin:     General: Skin is warm.      Findings: Erythema and rash present.      Comments: 2+ pedal edema.  There is erythema to the mid shin on both sides.  Good pulses.  No blistering.  No open wounds.   Neurological:      General: No focal deficit present.      Mental Status: She is alert.           LAB:  All pertinent labs reviewed and interpreted.  Labs Ordered and Resulted from Time of ED Arrival to Time of ED Departure   ROUTINE UA WITH MICROSCOPIC REFLEX TO CULTURE - Abnormal       Result Value    Color Urine Yellow      Appearance Urine Slightly Cloudy (*)     Glucose Urine Negative      Bilirubin Urine Negative      Ketones Urine Negative      Specific Gravity Urine 1.026      Blood Urine Negative      pH Urine 6.0      Protein Albumin Urine 10  (*)     Urobilinogen Urine <2.0      Nitrite Urine Negative      Leukocyte Esterase Urine Negative      Bacteria Urine Few (*)     Mucus Urine Present (*)     RBC Urine 1      WBC Urine 0      Squamous Epithelials Urine 12 (*)    CRP INFLAMMATION - Abnormal    CRP 1.4 (*)    ERYTHROCYTE SEDIMENTATION RATE AUTO - Abnormal    Erythrocyte Sedimentation Rate  28 (*)    CBC WITH PLATELETS AND DIFFERENTIAL - Abnormal    WBC Count 7.7      RBC Count 4.46      Hemoglobin 10.1 (*)     Hematocrit 34.0 (*)     MCV 76 (*)     MCH 22.6 (*)     MCHC 29.7 (*)     RDW 16.5 (*)     Platelet Count 365      % Neutrophils 58      % Lymphocytes 31      % Monocytes 7      % Eosinophils 4      % Basophils 0      % Immature Granulocytes 0      NRBCs per 100 WBC 0      Absolute Neutrophils 4.4      Absolute Lymphocytes 2.4      Absolute Monocytes 0.6      Absolute Eosinophils 0.3      Absolute Basophils 0.0      Absolute Immature Granulocytes 0.0      Absolute NRBCs 0.0     COMPREHENSIVE METABOLIC PANEL - Normal    Sodium 140      Potassium 3.7      Chloride 102      Carbon Dioxide (CO2) 28      Anion Gap 10      Urea Nitrogen 10      Creatinine 0.66      Calcium 9.3      Glucose 125      Alkaline Phosphatase 61      AST 39      ALT 45      Protein Total 6.8      Albumin 3.8      Bilirubin Total 0.3      GFR Estimate >90     HCG QUALITATIVE PREGNANCY - Normal    hCG Serum Qualitative Negative     HCG QUALITATIVE URINE POCT - Normal    HCG Qual Urine Negative      Internal QC Check POCT Valid      POCT Kit Lot Number [id number]      POCT Kit Expiration Date 3/31/2023         RADIOLOGY:  Reviewed all pertinent imaging. Please see official radiology report.  CT Abdomen Pelvis w Contrast   Final Result   IMPRESSION:    1.  No definite acute abnormalities or CT findings to explain the patient's symptoms.      2.  Mild diffuse fatty infiltration of the liver.      3.  Cholecystectomy. No biliary dilatation.      4.  Large amount of stool in the colon. No evidence for obstruction or acute bowel findings.      US Lower Extremity Venous Duplex Bilateral   Final Result   IMPRESSION:   1.  No deep venous thrombosis in the bilateral lower extremities.              I, Chapo Villeda, am serving as a scribe to document services personally performed by Dr. Shade Warren, based on my observation and the  provider's statements to me. I, Shade Warren MD attest that Chapo Villeda is acting in a scribe capacity, has observed my performance of the services and has documented them in accordance with my direction.    Shade Warren M.D.  Emergency Medicine  [facility] EMERGENCY ROOM  [address] [facility] [address]  [phone]  Dept: [phone]     Shade Warren MD  01/14/22 0210

## 2022-01-14 NOTE — ED TRIAGE NOTES
Noticed redness, swelling, and pain to bilateral lower extremities a few days ago. No fevers noted at home. States legs are warm to touch and feel tight. No meds PTA.  Pt was recently hospitalized in [place] for tooth infection and covid + at that time.

## 2022-09-10 ENCOUNTER — HEALTH MAINTENANCE LETTER (OUTPATIENT)
Age: 37
End: 2022-09-10

## 2023-10-01 ENCOUNTER — HEALTH MAINTENANCE LETTER (OUTPATIENT)
Age: 38
End: 2023-10-01

## 2024-02-05 ENCOUNTER — HOSPITAL ENCOUNTER (EMERGENCY)
Facility: CLINIC | Age: 39
Discharge: HOME | End: 2024-02-05
Attending: EMERGENCY MEDICINE | Admitting: EMERGENCY MEDICINE
Payer: COMMERCIAL

## 2024-02-05 VITALS
SYSTOLIC BLOOD PRESSURE: 157 MMHG | OXYGEN SATURATION: 92 % | TEMPERATURE: 97.7 F | HEART RATE: 88 BPM | RESPIRATION RATE: 17 BRPM | DIASTOLIC BLOOD PRESSURE: 74 MMHG

## 2024-02-05 DIAGNOSIS — J06.9 VIRAL URI: ICD-10-CM

## 2024-02-05 LAB
ATRIAL RATE - MUSE: 104 BPM
DIASTOLIC BLOOD PRESSURE - MUSE: 103 MMHG
FLUAV RNA SPEC QL NAA+PROBE: NEGATIVE
FLUBV RNA RESP QL NAA+PROBE: NEGATIVE
INTERPRETATION ECG - MUSE: NORMAL
P AXIS - MUSE: 50 DEGREES
PR INTERVAL - MUSE: 170 MS
QRS DURATION - MUSE: 90 MS
QT - MUSE: 378 MS
QTC - MUSE: 497 MS
R AXIS - MUSE: 34 DEGREES
RSV RNA SPEC NAA+PROBE: NEGATIVE
SARS-COV-2 RNA RESP QL NAA+PROBE: NEGATIVE
SYSTOLIC BLOOD PRESSURE - MUSE: 186 MMHG
T AXIS - MUSE: 66 DEGREES
VENTRICULAR RATE- MUSE: 104 BPM

## 2024-02-05 PROCEDURE — 93005 ELECTROCARDIOGRAM TRACING: CPT | Performed by: EMERGENCY MEDICINE

## 2024-02-05 PROCEDURE — 87637 SARSCOV2&INF A&B&RSV AMP PRB: CPT | Performed by: EMERGENCY MEDICINE

## 2024-02-05 PROCEDURE — 99284 EMERGENCY DEPT VISIT MOD MDM: CPT

## 2024-02-05 ASSESSMENT — ACTIVITIES OF DAILY LIVING (ADL): ADLS_ACUITY_SCORE: 35

## 2024-02-05 NOTE — Clinical Note
Awilda Holley was seen and treated in our emergency department on 2/5/2024.  She may return to work on 02/07/2024.       If you have any questions or concerns, please don't hesitate to call.      Shade Warren MD

## 2024-02-05 NOTE — ED TRIAGE NOTES
Patient arrives after being seen in urgent care today for sore throat, SOB, and low oxygen saturation (per patient). Patient states after being seen in urgent care she went home with a pulse ox to keep an eye on it. She noticed her O2 dropping to 93-95% and became worrisome. Patient endorses throat pain 4/10, SOB upon exertion and a diagnosis of pink eye today at urgent care. Patient denies chest pain, abdomin pain, N/V, headache, etc. Patient took ibuprofen at 7:30PM tonight. Partner, Mino, at bedside.      Triage Assessment (Adult)       Row Name 02/05/24 0104          Triage Assessment    Airway WDL WDL        Respiratory WDL    Respiratory WDL X;rhythm/pattern     Rhythm/Pattern, Respiratory shortness of breath;dyspnea upon exertion        Skin Circulation/Temperature WDL    Skin Circulation/Temperature WDL WDL        Cardiac WDL    Cardiac WDL rhythm     Pulse Rate & Regularity tachycardic        Peripheral/Neurovascular WDL    Peripheral Neurovascular WDL WDL        Cognitive/Neuro/Behavioral WDL    Cognitive/Neuro/Behavioral WDL WDL

## 2024-02-05 NOTE — ED PROVIDER NOTES
EMERGENCY DEPARTMENT ENCOUNTER      NAME: Awilda Holley  AGE: 38 year old female  YOB: 1985  MRN: [id number]  EVALUATION DATE & TIME: 2/5/2024  1:00 AM    PCP: System, Provider Not In    ED PROVIDER: Shade Warren M.D.      Chief Complaint   Patient presents with    Pharyngitis         FINAL IMPRESSION:  1. Viral URI          ED COURSE & MEDICAL DECISION MAKING:    Pertinent Labs & Imaging studies reviewed. (See chart for details)  38 year old female presents to the Emergency Department for evaluation of upper respiratory symptoms.  Concerned that her oxygen saturation was low.  Is only as low as 93 at home.  Does have some degree of sleep apnea based on her history.  This is normal.  Lungs are clear.  Did do COVID and flu RSV and these are all negative.  Oxygen saturation normal throughout her visit here.  I do not think this is cardiac.  No signs of PE.  I do not think she needs further workup.  Will discharge home.    1:02 AM I met with the patient to gather history and to perform my initial exam. I discussed the plan for care while in the Emergency Department.       At the conclusion of the encounter I discussed the results of all of the tests and the disposition. The questions were answered. The patient or family acknowledged understanding and was agreeable with the care plan.     Medical Decision Making  Obtained supplemental history:Supplemental history obtained?: Documented in chart  Reviewed external records: External records reviewed?: Documented in chart and Outpatient Record: [facility] on 02/04/2024  Care impacted by chronic illness:N/A  Care significantly affected by social determinants of health:Access to Medical Care  Did you consider but not order tests?: Work up considered but not performed and documented in chart, if applicable  Did you interpret images independently?: Independent interpretation of ECG and images noted in documentation, when  applicable.  Consultation discussion with other provider:Did you involve another provider (consultant, , pharmacy, etc.)?: No  Discharge. No recommendations on prescription strength medication(s). See documentation for any additional details.         MEDICATIONS GIVEN IN THE EMERGENCY:  Medications - No data to display    NEW PRESCRIPTIONS STARTED AT TODAY'S ER VISIT  Discharge Medication List as of 2/5/2024  2:30 AM             =================================================================    HPI    Patient information was obtained from: The patient    Use of : N/A         Awilda Holley is a 38 year old female with no pertinent history who presents to this ED for evaluation of sore throat and low oxygen.    Per chart review, the patient was seen at [facility] on 02/04/2024  for an evaluation of sore throat, loss of voice, shortness of breath, and eye redness and drainage. She tested negative for COVID at home. No personal history of COPD or asthma. Patient was hypertensive in clinic. Her O2 sats were wavering between 97% and 94%, with her lowest O2 Sats at 93% one time. Rapid strep test came back negative. She was discharged with antibiotic eyedrops and recommended to be seen in the ER if her O2 sats lower in the 90s.    The patient was seen at Urgent Care yesterday for sore throat. Her strep test came back negative but her clinician was worried of her low O2 sats and recommended to be seen in the ER if her O2 sats went below 90 %. Her O2 did go below 90%. She is mildly short of breath and coughing. She does not partake in any tobacco use or any chronic lung history.    Otherwise, the patient denied having chest pain,  and any other medical complaints or concerns at this time.        PAST MEDICAL HISTORY:  Past Medical History:   Diagnosis Date    NO ACTIVE PROBLEMS        PAST SURGICAL HISTORY:  Past Surgical History:   Procedure Laterality Date    IN LAP,ABDOMEN,ASPIRATE CYST       Description: Gynecologic Laparoscopy With Aspiration;  Proc Date: 2009;  Comments: with removal ovarian cysts    [facility]  DELIVERY ONLY      Description:  Section Low Transverse;  Proc Date: 2007;    ZC LAP,CHOLECYSTECTOMY/EXPLORE      Description: Cholecystectomy Laparoscopic;  Proc Date: 2009;           CURRENT MEDICATIONS:    No current facility-administered medications for this encounter.     Current Outpatient Medications   Medication    norethindrone-ethinyl estradiol-iron (ESTROSTEP FE) 1-20/1-30/1-35 MG-MCG per tablet         ALLERGIES:  Allergies   Allergen Reactions    Pcn [Penicillins]     Tylenol [Acetaminophen]        FAMILY HISTORY:  No family history on file.    SOCIAL HISTORY:   Social History     Socioeconomic History    Marital status: Single   Tobacco Use    Smoking status: Never    Smokeless tobacco: Never       VITALS:  BP (!) 157/74   Pulse 88   Temp 97.7  F (36.5  C) (Oral)   Resp 17   SpO2 92%     PHYSICAL EXAM    Physical Exam  Vitals and nursing note reviewed.   Constitutional:       General: She is not in acute distress.     Appearance: She is not diaphoretic.   HENT:      Head: Atraumatic.      Mouth/Throat:      Pharynx: No oropharyngeal exudate.   Eyes:      General: No scleral icterus.     Pupils: Pupils are equal, round, and reactive to light.   Cardiovascular:      Rate and Rhythm: Normal rate and regular rhythm.      Heart sounds: Normal heart sounds.   Pulmonary:      Effort: No respiratory distress.      Breath sounds: Normal breath sounds.   Abdominal:      Palpations: Abdomen is soft.      Tenderness: There is no abdominal tenderness. There is no guarding or rebound. Negative signs include Akhtar's sign.   Musculoskeletal:         General: No tenderness.   Skin:     General: Skin is warm.      Findings: No rash.   Neurological:      General: No focal deficit present.      Mental Status: She is alert.           LAB:  All pertinent labs  reviewed and interpreted.  Labs Ordered and Resulted from Time of ED Arrival to Time of ED Departure   INFLUENZA A/B, RSV, & SARS-COV2 PCR - Normal       Result Value    Influenza A PCR Negative      Influenza B PCR Negative      RSV PCR Negative      SARS CoV2 PCR Negative         RADIOLOGY:  Reviewed all pertinent imaging. Please see official radiology report.  No orders to display       EKG:    Time: 108  Impression: Sinus tachycardia.  Otherwise normal.  No change from previous dated May 4, 2014.  Sinus tachycardia rate of 104.  .  QRS 90.  QTc 497.  Above EKG is reviewed interpreted by myself    I have independently reviewed and interpreted the EKG(s) documented above.    PROCEDURES:   None      I, Quentin Sumner, am serving as a scribe to document services personally performed by Dr. Shade Warren, based on my observation and the provider's statements to me. I, Shade Warren MD attest that Quentin Sumner is acting in a scribe capacity, has observed my performance of the services and has documented them in accordance with my direction.    Shade Warren M.D.  Emergency Medicine  [facility] EMERGENCY ROOM  [address] [id number] [phone]  Dept: [phone]       Shade Warren MD  02/05/24 1711

## 2024-11-13 ENCOUNTER — APPOINTMENT (OUTPATIENT)
Dept: CT IMAGING | Facility: CLINIC | Age: 39
End: 2024-11-13
Attending: EMERGENCY MEDICINE

## 2024-11-13 ENCOUNTER — HOSPITAL ENCOUNTER (EMERGENCY)
Facility: CLINIC | Age: 39
Discharge: HOME | End: 2024-11-13
Attending: EMERGENCY MEDICINE | Admitting: EMERGENCY MEDICINE

## 2024-11-13 VITALS
HEART RATE: 107 BPM | DIASTOLIC BLOOD PRESSURE: 86 MMHG | OXYGEN SATURATION: 92 % | HEIGHT: 63 IN | SYSTOLIC BLOOD PRESSURE: 138 MMHG | BODY MASS INDEX: 51.91 KG/M2 | TEMPERATURE: 97.7 F | WEIGHT: 293 LBS | RESPIRATION RATE: 18 BRPM

## 2024-11-13 DIAGNOSIS — L02.01 FACIAL ABSCESS: ICD-10-CM

## 2024-11-13 LAB
ANION GAP SERPL CALCULATED.3IONS-SCNC: 13 MMOL/L (ref 7–15)
BASOPHILS # BLD AUTO: 0 10E3/UL (ref 0–0.2)
BASOPHILS NFR BLD AUTO: 0 %
BUN SERPL-MCNC: 13.5 MG/DL (ref 6–20)
CALCIUM SERPL-MCNC: 9.6 MG/DL (ref 8.8–10.4)
CHLORIDE SERPL-SCNC: 97 MMOL/L (ref 98–107)
CREAT SERPL-MCNC: 0.78 MG/DL (ref 0.51–0.95)
EGFRCR SERPLBLD CKD-EPI 2021: >90 ML/MIN/1.73M2
EOSINOPHIL # BLD AUTO: 0.3 10E3/UL (ref 0–0.7)
EOSINOPHIL NFR BLD AUTO: 3 %
ERYTHROCYTE [DISTWIDTH] IN BLOOD BY AUTOMATED COUNT: 13.2 % (ref 10–15)
GLUCOSE SERPL-MCNC: 135 MG/DL (ref 70–99)
HCG SERPL QL: NEGATIVE
HCO3 SERPL-SCNC: 29 MMOL/L (ref 22–29)
HCT VFR BLD AUTO: 42.1 % (ref 35–47)
HGB BLD-MCNC: 13.6 G/DL (ref 11.7–15.7)
IMM GRANULOCYTES # BLD: 0.1 10E3/UL
IMM GRANULOCYTES NFR BLD: 0 %
LYMPHOCYTES # BLD AUTO: 2.9 10E3/UL (ref 0.8–5.3)
LYMPHOCYTES NFR BLD AUTO: 25 %
MCH RBC QN AUTO: 26.2 PG (ref 26.5–33)
MCHC RBC AUTO-ENTMCNC: 32.3 G/DL (ref 31.5–36.5)
MCV RBC AUTO: 81 FL (ref 78–100)
MONOCYTES # BLD AUTO: 0.6 10E3/UL (ref 0–1.3)
MONOCYTES NFR BLD AUTO: 5 %
NEUTROPHILS # BLD AUTO: 7.6 10E3/UL (ref 1.6–8.3)
NEUTROPHILS NFR BLD AUTO: 66 %
NRBC # BLD AUTO: 0 10E3/UL
NRBC BLD AUTO-RTO: 0 /100
PLATELET # BLD AUTO: 392 10E3/UL (ref 150–450)
POTASSIUM SERPL-SCNC: 3.6 MMOL/L (ref 3.4–5.3)
RBC # BLD AUTO: 5.19 10E6/UL (ref 3.8–5.2)
SODIUM SERPL-SCNC: 139 MMOL/L (ref 135–145)
WBC # BLD AUTO: 11.5 10E3/UL (ref 4–11)

## 2024-11-13 PROCEDURE — 85018 HEMOGLOBIN: CPT | Performed by: EMERGENCY MEDICINE

## 2024-11-13 PROCEDURE — 70491 CT SOFT TISSUE NECK W/DYE: CPT

## 2024-11-13 PROCEDURE — 82435 ASSAY OF BLOOD CHLORIDE: CPT | Performed by: EMERGENCY MEDICINE

## 2024-11-13 PROCEDURE — 87205 SMEAR GRAM STAIN: CPT | Performed by: EMERGENCY MEDICINE

## 2024-11-13 PROCEDURE — 250N000013 HC RX MED GY IP 250 OP 250 PS 637: Performed by: EMERGENCY MEDICINE

## 2024-11-13 PROCEDURE — 80048 BASIC METABOLIC PNL TOTAL CA: CPT | Performed by: EMERGENCY MEDICINE

## 2024-11-13 PROCEDURE — 85004 AUTOMATED DIFF WBC COUNT: CPT | Performed by: EMERGENCY MEDICINE

## 2024-11-13 PROCEDURE — 36415 COLL VENOUS BLD VENIPUNCTURE: CPT | Performed by: EMERGENCY MEDICINE

## 2024-11-13 PROCEDURE — 84703 CHORIONIC GONADOTROPIN ASSAY: CPT | Performed by: EMERGENCY MEDICINE

## 2024-11-13 PROCEDURE — 250N000011 HC RX IP 250 OP 636: Performed by: EMERGENCY MEDICINE

## 2024-11-13 PROCEDURE — 99285 EMERGENCY DEPT VISIT HI MDM: CPT | Mod: 25

## 2024-11-13 RX ORDER — LEVOFLOXACIN 5 MG/ML
500 INJECTION, SOLUTION INTRAVENOUS ONCE
Status: DISCONTINUED | OUTPATIENT
Start: 2024-11-13 | End: 2024-11-13

## 2024-11-13 RX ORDER — OXYCODONE HYDROCHLORIDE 5 MG/1
5 TABLET ORAL EVERY 6 HOURS PRN
Qty: 12 TABLET | Refills: 0 | Status: SHIPPED | OUTPATIENT
Start: 2024-11-13 | End: 2024-11-16

## 2024-11-13 RX ORDER — IOPAMIDOL 755 MG/ML
90 INJECTION, SOLUTION INTRAVASCULAR ONCE
Status: COMPLETED | OUTPATIENT
Start: 2024-11-13 | End: 2024-11-13

## 2024-11-13 RX ORDER — LEVOFLOXACIN 500 MG/1
500 TABLET, FILM COATED ORAL ONCE
Status: COMPLETED | OUTPATIENT
Start: 2024-11-13 | End: 2024-11-13

## 2024-11-13 RX ORDER — LEVOFLOXACIN 500 MG/1
500 TABLET, FILM COATED ORAL DAILY
Qty: 7 TABLET | Refills: 0 | Status: SHIPPED | OUTPATIENT
Start: 2024-11-13 | End: 2024-11-20

## 2024-11-13 RX ADMIN — LEVOFLOXACIN 500 MG: 500 TABLET, FILM COATED ORAL at 22:52

## 2024-11-13 RX ADMIN — IOPAMIDOL 90 ML: 755 INJECTION, SOLUTION INTRAVENOUS at 21:43

## 2024-11-13 ASSESSMENT — COLUMBIA-SUICIDE SEVERITY RATING SCALE - C-SSRS
1. IN THE PAST MONTH, HAVE YOU WISHED YOU WERE DEAD OR WISHED YOU COULD GO TO SLEEP AND NOT WAKE UP?: NO
6. HAVE YOU EVER DONE ANYTHING, STARTED TO DO ANYTHING, OR PREPARED TO DO ANYTHING TO END YOUR LIFE?: NO
2. HAVE YOU ACTUALLY HAD ANY THOUGHTS OF KILLING YOURSELF IN THE PAST MONTH?: NO

## 2024-11-13 ASSESSMENT — ACTIVITIES OF DAILY LIVING (ADL): ADLS_ACUITY_SCORE: 0

## 2024-11-14 NOTE — ED TRIAGE NOTES
Pt reports having cyst to midline neck under chin for 15+ years that would occasionally drain serous fluid. Was told this was cosmetic and did not require intervention. Over the past two days, it's become reddened, swollen, and painful.     Triage Assessment (Adult)       Row Name 11/13/24 1932          Triage Assessment    Airway WDL WDL        Respiratory WDL    Respiratory WDL WDL        Skin Circulation/Temperature WDL    Skin Circulation/Temperature WDL WDL        Cardiac WDL    Cardiac WDL X;rhythm     Pulse Rate & Regularity tachycardic        Peripheral/Neurovascular WDL    Peripheral Neurovascular WDL WDL        Cognitive/Neuro/Behavioral WDL    Cognitive/Neuro/Behavioral WDL WDL

## 2024-11-14 NOTE — ED PROVIDER NOTES
EMERGENCY DEPARTMENT ENCOUNTER      NAME: Awilda Holley  AGE: 39 year old female  YOB: 1985  MRN: [id number]  EVALUATION DATE & TIME: No admission date for patient encounter.    PCP: Ida Mace    ED PROVIDER: Efrain Ty M.D.      Chief Complaint   Patient presents with    Abscess         FINAL IMPRESSION:  1.  Acute under chin pimple with cellulitis.      ED COURSE & MEDICAL DECISION MAKIN. I met with the patient to gather history and to perform my initial exam. We discussed plans for the ED course, including diagnostic testing and treatment. PPE worn: cloth mask.  Patient has had a bump underneath her chin in the midline off-and-on for 15 years.  Occasionally drains clear fluid.  Was told in the past that this was a come as a cosmetic issue and did not need any further intervention.  However now in the last 2 or 3 days has become increasingly swollen, red, hot and tender.  My concern is this may represent an embryo logical remnant such as a thyroglossal duct cyst or branchial cleft cyst.  IV and antibiotics were ordered.  Will get a CAT scan of the area.  .  CT showing a subcutaneous abscess only with no connection to deep structures.  White count mildly elevated but CBC otherwise negative.  Chemistries negative and sugar 135.  Pregnancy test negative.  Procedures outlined below.  Patient discharged home on antibiotics and pain medicine.  Patient in agreement.    Pertinent Labs & Imaging studies reviewed. (See chart for details)  39 year old female presents to the Emergency Department for evaluation of infection underneath the chin in the midline.    At the conclusion of the encounter I discussed the results of all of the tests and the disposition. The questions were answered. The patient or family acknowledged understanding and was agreeable with the care plan.              Medical Decision Making  Obtained supplemental history:Supplemental history obtained?:  No  Reviewed external records: Both inpatient and outpatient computer records were reviewed.  Care impacted by chronic illness: Obesity, low back pain, depression, anxiety, hypertension, endometriosis  Did you consider but not order tests?: Work up considered but not performed and documented in chart, if applicable  Did you interpret images independently?: Independent interpretation of ECG and images noted in documentation, when applicable.  Consultation discussion with other provider:Did you involve another provider (consultant, , pharmacy, etc.)?: No  Anticipate probable discharge home after evaluation.    MIPS: Not Applicable        MEDICATIONS GIVEN IN THE EMERGENCY:  Medications   levofloxacin (LEVAQUIN) infusion 500 mg (has no administration in time range)       NEW PRESCRIPTIONS STARTED AT TODAY'S ER VISIT  New Prescriptions    No medications on file          =================================================================    HPI    Patient information was obtained from: The patient.    Use of : N/A         Awilda Holley is a 39 year old female with a pertinent history of morbid obesity, low back pain, depression, anxiety, hypertension who presents to this ED today  for evaluation of a recurrent pimple underneath the chin in the midline that she has had for 15 years.  Off-and-on it will drain clear fluid.  She was told that this was cosmetic and did not need surgeries.  However, now in the last 2 to 3 days has become red hot and swollen.    She does not identify any waxing or waning symptoms otherwise, exacerbating or alleviating features, associated symptoms except as mentioned.  She otherwise denies any pain related complaints.    REVIEW OF SYSTEMS   Review of Systems recurrent skin pimple midline under the chin.    PAST MEDICAL HISTORY:  Past Medical History:   Diagnosis Date    NO ACTIVE PROBLEMS        PAST SURGICAL HISTORY:  Past Surgical History:   Procedure Laterality Date    CT  LAP,ABDOMEN,ASPIRATE CYST      Description: Gynecologic Laparoscopy With Aspiration;  Proc Date: 2009;  Comments: with removal ovarian cysts    Z  DELIVERY ONLY      Description:  Section Low Transverse;  Proc Date: 2007;    ZZC LAP,CHOLECYSTECTOMY/EXPLORE      Description: Cholecystectomy Laparoscopic;  Proc Date: 2009;           CURRENT MEDICATIONS:    norethindrone-ethinyl estradiol-iron (ESTROSTEP FE) 1-20/1-30/1-35 MG-MCG per tablet        ALLERGIES:  Allergies   Allergen Reactions    Pcn [Penicillins]     Tylenol [Acetaminophen]        FAMILY HISTORY:  No family history on file.    SOCIAL HISTORY:   Social History     Socioeconomic History    Marital status: Single   Tobacco Use    Smoking status: Never    Smokeless tobacco: Never     Social Drivers of Health     Financial Resource Strain: Low Risk  (2024)    Received from [facility]    Financial Resource Strain     Difficulty of Paying Living Expenses: 3   Food Insecurity: No Food Insecurity (2024)    [facility]    Food Insecurity     Do you worry your food will run out before you are able to buy more?: 1   Transportation Needs: No Transportation Needs (2024)    Received from [facility]    Transportation Needs     Does lack of transportation keep you from medical appointments?: 1     Does lack of transportation keep you from work, meetings or getting things that you need?: 1   Social Connections: Socially Integrated (2024)    Received from [facility]    Social Connections     Do you often feel lonely or isolated from those around you?: 0   Housing Stability: Low Risk  (2024)    Received from [facility]    Housing Stability     What is your housing situation today?: 1   No drugs, alcohol, or tobacco.    VITALS:  BP (!)  "142/100   Pulse 118   Temp 97.7  F (36.5  C) (Oral)   Resp 18   Ht 1.6 m (5' 3\")   Wt 145.2 kg (320 lb)   SpO2 98%   BMI 56.69 kg/m      PHYSICAL EXAM    Vital Signs:  BP (!) 142/100   Pulse 118   Temp 97.7  F (36.5  C) (Oral)   Resp 18   Ht 1.6 m (5' 3\")   Wt 145.2 kg (320 lb)   SpO2 98%   BMI 56.69 kg/m    General:  On entering the room she is in no apparent distress.    Neck:  Neck supple with full range of motion and nontender.    Back:  Back and spine are nontender.  No costovertebral angle tenderness.    HEENT:  Oropharynx clear with moist mucous membranes.  HEENT unremarkable.  There is a white pimple in the midline underneath the chin.  This could be a simple subcutaneous abscess or sebaceous cyst infection but I am concerned with the 15-year nature of this that this may represent a thyroglossal duct cyst remnant.  Pulmonary:  Chest clear to auscultation without rhonchi rales or wheezing.    Cardiovascular:  Cardiac regular rate and rhythm without murmurs rubs or gallops.    Abdomen:  Abdomen soft nontender.  There is no rebound or guarding.    Muskuloskeletal:  She moves all 4 without any difficulty and has normal neurovascular exams.  Extremities without clubbing, cyanosis, or edema.  Legs and calves are nontender.    Neuro:  She is alert and oriented ×3 and moves all extremities symmetrically.    Psych:  Normal affect.    Skin:  Unremarkable and warm and dry.       LAB:  All pertinent labs reviewed and interpreted.  Labs Ordered and Resulted from Time of ED Arrival to Time of ED Departure   BASIC METABOLIC PANEL - Abnormal       Result Value    Sodium 139      Potassium 3.6      Chloride 97 (*)     Carbon Dioxide (CO2) 29      Anion Gap 13      Urea Nitrogen 13.5      Creatinine 0.78      GFR Estimate >90      Calcium 9.6      Glucose 135 (*)    CBC WITH PLATELETS AND DIFFERENTIAL - Abnormal    WBC Count 11.5 (*)     RBC Count 5.19      Hemoglobin 13.6      Hematocrit 42.1      MCV 81      " MCH 26.2 (*)     MCHC 32.3      RDW 13.2      Platelet Count 392      % Neutrophils 66      % Lymphocytes 25      % Monocytes 5      % Eosinophils 3      % Basophils 0      % Immature Granulocytes 0      NRBCs per 100 WBC 0      Absolute Neutrophils 7.6      Absolute Lymphocytes 2.9      Absolute Monocytes 0.6      Absolute Eosinophils 0.3      Absolute Basophils 0.0      Absolute Immature Granulocytes 0.1      Absolute NRBCs 0.0     HCG QUALITATIVE PREGNANCY - Normal    hCG Serum Qualitative Negative     AEROBIC BACTERIAL CULTURE ROUTINE       RADIOLOGY:  Reviewed all pertinent imaging. Please see official radiology report.  Radiologist notes a soft subcutaneous abscess approximately 2 x 2 x 2 cm.  There is no connection to deep structures.  Findings consistent with a subcutaneous abscess.    Procedure:    The area was cleaned with alcohol.  A 23-gauge needle was introduced through the blister and progress forward approximately quarter centimeter and approximately 3 cc of bloody and purulent material was removed.  This was sent for aerobic wound culture.  Clean and sterile bandage was applied.  Patient tolerated the procedure well.    Efrain Ty M.D.  Emergency Medicine  [facility] EMERGENCY ROOM  [address] [place] [id number] [phone]  Dept: [phone]       Efrain Ty MD  11/13/24 7257

## 2024-11-14 NOTE — DISCHARGE INSTRUCTIONS
Clean area with soap and water twice daily.  Tylenol or ibuprofen every 6 hours as needed for pain.  Oxycodone every 6 hours instead only if needed for stronger pain.  Do not drive with this.  Levaquin as prescribed until gone.  Your clinic should follow-up with this Friday, Monday or Tuesday.  See them sooner if worse or problems or concerns.  Problem can recur.  Your clinic may want to refer you to surgery for deeper excision.

## 2024-11-15 LAB
BACTERIA WND CULT: NO GROWTH
GRAM STAIN RESULT: NORMAL
GRAM STAIN RESULT: NORMAL

## 2024-11-16 ENCOUNTER — HOSPITAL ENCOUNTER (EMERGENCY)
Facility: CLINIC | Age: 39
Discharge: HOME | End: 2024-11-17

## 2024-11-16 ENCOUNTER — APPOINTMENT (OUTPATIENT)
Dept: CT IMAGING | Facility: CLINIC | Age: 39
End: 2024-11-16

## 2024-11-16 DIAGNOSIS — L02.11 ABSCESS OF NECK: ICD-10-CM

## 2024-11-16 PROCEDURE — 250N000011 HC RX IP 250 OP 636

## 2024-11-16 PROCEDURE — 70491 CT SOFT TISSUE NECK W/DYE: CPT

## 2024-11-16 PROCEDURE — 99285 EMERGENCY DEPT VISIT HI MDM: CPT | Mod: 25

## 2024-11-16 PROCEDURE — 10060 I&D ABSCESS SIMPLE/SINGLE: CPT

## 2024-11-16 PROCEDURE — 96375 TX/PRO/DX INJ NEW DRUG ADDON: CPT

## 2024-11-16 PROCEDURE — 96374 THER/PROPH/DIAG INJ IV PUSH: CPT | Mod: 59

## 2024-11-16 PROCEDURE — 96376 TX/PRO/DX INJ SAME DRUG ADON: CPT

## 2024-11-16 PROCEDURE — 250N000013 HC RX MED GY IP 250 OP 250 PS 637

## 2024-11-16 RX ORDER — KETOROLAC TROMETHAMINE 15 MG/ML
15 INJECTION, SOLUTION INTRAMUSCULAR; INTRAVENOUS ONCE
Status: COMPLETED | OUTPATIENT
Start: 2024-11-17 | End: 2024-11-16

## 2024-11-16 RX ORDER — HYDROMORPHONE HYDROCHLORIDE 1 MG/ML
0.5 INJECTION, SOLUTION INTRAMUSCULAR; INTRAVENOUS; SUBCUTANEOUS ONCE
Status: COMPLETED | OUTPATIENT
Start: 2024-11-17 | End: 2024-11-16

## 2024-11-16 RX ORDER — ONDANSETRON 2 MG/ML
4 INJECTION INTRAMUSCULAR; INTRAVENOUS ONCE
Status: COMPLETED | OUTPATIENT
Start: 2024-11-16 | End: 2024-11-16

## 2024-11-16 RX ORDER — IOPAMIDOL 755 MG/ML
90 INJECTION, SOLUTION INTRAVASCULAR ONCE
Status: COMPLETED | OUTPATIENT
Start: 2024-11-16 | End: 2024-11-16

## 2024-11-16 RX ORDER — HYDROMORPHONE HYDROCHLORIDE 1 MG/ML
0.5 INJECTION, SOLUTION INTRAMUSCULAR; INTRAVENOUS; SUBCUTANEOUS ONCE
Status: COMPLETED | OUTPATIENT
Start: 2024-11-16 | End: 2024-11-16

## 2024-11-16 RX ORDER — OXYCODONE HYDROCHLORIDE 5 MG/1
5 TABLET ORAL ONCE
Status: COMPLETED | OUTPATIENT
Start: 2024-11-16 | End: 2024-11-16

## 2024-11-16 RX ADMIN — HYDROMORPHONE HYDROCHLORIDE 0.5 MG: 1 INJECTION, SOLUTION INTRAMUSCULAR; INTRAVENOUS; SUBCUTANEOUS at 23:50

## 2024-11-16 RX ADMIN — OXYCODONE 5 MG: 5 TABLET ORAL at 19:57

## 2024-11-16 RX ADMIN — ONDANSETRON 4 MG: 2 INJECTION, SOLUTION INTRAMUSCULAR; INTRAVENOUS at 21:22

## 2024-11-16 RX ADMIN — IOPAMIDOL 90 ML: 755 INJECTION, SOLUTION INTRAVENOUS at 21:58

## 2024-11-16 RX ADMIN — KETOROLAC TROMETHAMINE 15 MG: 15 INJECTION, SOLUTION INTRAMUSCULAR; INTRAVENOUS at 23:50

## 2024-11-16 RX ADMIN — HYDROMORPHONE HYDROCHLORIDE 0.5 MG: 1 INJECTION, SOLUTION INTRAMUSCULAR; INTRAVENOUS; SUBCUTANEOUS at 21:24

## 2024-11-16 ASSESSMENT — COLUMBIA-SUICIDE SEVERITY RATING SCALE - C-SSRS
1. IN THE PAST MONTH, HAVE YOU WISHED YOU WERE DEAD OR WISHED YOU COULD GO TO SLEEP AND NOT WAKE UP?: NO
2. HAVE YOU ACTUALLY HAD ANY THOUGHTS OF KILLING YOURSELF IN THE PAST MONTH?: NO
6. HAVE YOU EVER DONE ANYTHING, STARTED TO DO ANYTHING, OR PREPARED TO DO ANYTHING TO END YOUR LIFE?: NO

## 2024-11-16 ASSESSMENT — ACTIVITIES OF DAILY LIVING (ADL)
ADLS_ACUITY_SCORE: 0

## 2024-11-16 NOTE — ED TRIAGE NOTES
Pt c/o abscess on her neck. She was seen here a few days ago for the same thing and had it drained. She went to her clinic and they told her to come back to get it drained again. She is on antibiotics and denies any new pain or redness to the area.     Triage Assessment (Adult)       Row Name 11/16/24 3743          Triage Assessment    Airway WDL WDL        Respiratory WDL    Respiratory WDL WDL        Skin Circulation/Temperature WDL    Skin Circulation/Temperature WDL X  Wound on neck        Cardiac WDL    Cardiac WDL WDL        Peripheral/Neurovascular WDL    Peripheral Neurovascular WDL WDL        Cognitive/Neuro/Behavioral WDL    Cognitive/Neuro/Behavioral WDL WDL

## 2024-11-17 VITALS
SYSTOLIC BLOOD PRESSURE: 141 MMHG | TEMPERATURE: 98 F | WEIGHT: 293 LBS | BODY MASS INDEX: 51.91 KG/M2 | HEART RATE: 97 BPM | HEIGHT: 63 IN | OXYGEN SATURATION: 91 % | RESPIRATION RATE: 21 BRPM | DIASTOLIC BLOOD PRESSURE: 74 MMHG

## 2024-11-17 ASSESSMENT — ACTIVITIES OF DAILY LIVING (ADL): ADLS_ACUITY_SCORE: 0

## 2024-11-17 NOTE — DISCHARGE INSTRUCTIONS
Tylenol and/or ibuprofen as needed for pain. You may take oxycodone as needed for severe pain - do NOT drive on this medication as it can cause drowsiness. Additionally, please take a stool softener as this medication can cause constipation. This medication can be addicting, please limit your use and discard any remaining tablets.     Keep your bandage in place and clean and dry for the first 24 hours, then only clean water - no dirty water (swimming pools, hot tubes, saunas, lakes, etc.) until your wound is healed.     Continue antibiotic as previously instructed.    Tylenol (Acetaminophen) Discharge Instructions:  You may take 2 tablets of regular strength, over-the-counter, Tylenol (acetaminophen) every 4-6 hours as needed for pain.  Take no more than 4000 mg of Tylenol in a 24-hour period.      Avoid taking more than 1 acetaminophen-containing product at a time and be aware that many over-the-counter medications contain a combination of acetaminophen and other products.  If you are taking Tylenol in addition to a combination product please keep track of your daily acetaminophen dose to make sure you do not exceed the recommended 4000 mg.  Taking too much acetaminophen can cause permanent damage to your liver.    Ibuprofen/Naproxen Discharge Instructions:  You may take ibuprofen for pain control.  The maximum dose of (ibuprofen is 3200 mg ) in a 24-hour period.    Take this medication with food to prevent stomach irritation.  With long-term use this medication can irritate the stomach causing pain and lead to development of a stomach ulcer.  If you notice stomach pain or vomiting of coffee-ground colored vomit or blood, please be seen by a healthcare provider.  Attempt to use this medication for the shortest time possible.      Follow-up with your ENT specialist and/or primary care provider in 1-2 days for reevaluation - referral placed today.    Return to the emergency department for any new or worsening  symptoms including worsening pain, redness/warmth/drainage/swelling, streaking redness, fever/chills, or any other concerning symptoms.    Take Care!  - Serenity Montesinos PA-C

## 2024-11-17 NOTE — ED PROVIDER NOTES
EMERGENCY DEPARTMENT ENCOUNTER      NAME: Awilda Holley  AGE: 39 year old female  YOB: 1985  MRN: [id number]  EVALUATION DATE & TIME: No admission date for patient encounter.    PCP: Ida Mace    ED PROVIDER: Serenity Montesinos PA-C      Chief Complaint   Patient presents with    Wound Infection         FINAL IMPRESSION:  1. Abscess of neck          ED COURSE & MEDICAL DECISION MAKIN:27 PM Met with patient for initial interview. Plan for care discussed.  7:45 PM Staffed patient with Dr. Mcfadden. Plan for bedside US and likely I&D.   7:50 PM Reevaluated and updated patient. Bedside US performed confirming local fluid collection. Patient requesting pain medication prior to I&D.  8:57 PM PM Reevaluated and updated patient. I&D performed as documented below.   9:15 PM Dr. Mcfadden evaluated patient during I&D. Plan for CT scan as no significant purulence obtained.   11:00 PM Reevaluated and updated patient. Plan for repeat I&D attempt after reviewing CT findings with Dr. Mcfadden.  11:30 PM Reevaluated and updated patient.   12:00 AM Reevaluated and updated patient. I&D performed with success. I discussed the plan for discharge with the patient, and patient is agreeable. We discussed supportive cares at home and reasons for return to the ER including new or worsening symptoms. All questions and concerns addressed. Patient to be discharged by RN.    39 year old female presents to the Emergency Department for evaluation of anterior neck abscess. Per chart review, patient was evaluated on 2024 and found to have subcutaneous abscess of anterior neck confirmed by CT scanning.  She underwent needle aspiration and was discharged on levofloxacin.  She was evaluated at urgent care earlier today and advised to  present to the ED for possible repeat incision and drainage.  She reports subjective improvement in swallowing and breathing, feeling as though she is less inflamed. She reports  that the redness has localized and is darker in color, she denies any drainage, fever/chills, or any other complaints. Upon exam, patient is afebrile, mildly hypertensive, but in no acute distress. Anterior subcutaneous focal fluctuance with overlying erythema as pictured below without obvious purulence. No surrounding palpable lymphadenopathy or extension into submandibular space. Differential diagnosis includes but not limited to recurrent abscess, which was confirmed with bedside US. Low suspicion for Jakob's angina. No difficulty swallowing or breathing. No trismus. Oropharynx clear. Therefore, repeat CT deferred. Bedside US confirmed subcutaneous abscess.     Patient was treated with oxycodone upon arrival with moderate improvement in symptoms. First attempt at incision and drainage without success. Reevaluated patient with Dr. Mcfadden. Plan for CT to further characterize. CT revealed 3 cm superficial abscess, deeper than initially anticipated. Reevaluated patient and re-attempted I&D with 18 G needle with success, deeper incision then made with successful purulence and purulent debris as below.    Plan to discharge patient home with prescription levofloxacin, strict return precautions, and close follow up with ENT for reevaluation and ongoing management. The patient was stable and well appearing upon discharge. The patient was advised to return to the ER if any new or worsening symptoms develop. The patient verbalizes understanding and agrees with the plan.     Medical Decision Making  Obtained supplemental history:Supplemental history obtained?: No  Reviewed external records: External records reviewed?: No  Care impacted by chronic illness:Documented in Chart  Did you consider but not order tests?: Work up considered but not performed and documented in chart, if applicable  Did you interpret images independently?: Independent interpretation of ECG and images noted in documentation, when  "applicable.  Consultation discussion with other provider:Did you involve another provider (consultant, , pharmacy, etc.)?: No  Discharge. I recommended the patient continue their current prescription strength medication(s): Levofloxacin. See documentation for any additional details.    MIPS: Not Applicable      MEDICATIONS GIVEN IN THE EMERGENCY:  Medications   oxyCODONE (ROXICODONE) tablet 5 mg (5 mg Oral $Given 11/16/24 1957)   HYDROmorphone (PF) (DILAUDID) injection 0.5 mg (0.5 mg Intravenous $Given 11/16/24 2124)   ondansetron (ZOFRAN) injection 4 mg (4 mg Intravenous $Given 11/16/24 2122)   iopamidol (ISOVUE-370) solution 90 mL (90 mLs Intravenous $Given 11/16/24 2158)   HYDROmorphone (PF) (DILAUDID) injection 0.5 mg (0.5 mg Intravenous $Given 11/16/24 2350)   ketorolac (TORADOL) injection 15 mg (15 mg Intravenous $Given 11/16/24 2350)       NEW PRESCRIPTIONS STARTED AT TODAY'S ER VISIT  Discharge Medication List as of 11/17/2024 12:57 AM             =================================================================    HPI    Patient information was obtained from: patient     Use of : N/A       Awilda Holley is a 39 year old female with a pertinent history of obesity who presents to this ED via walk-in for evaluation of neck abscess.    About 1 week ago the patient noticed that the area around a skin tag on her anterior neck was starting to swell. It began as \"pea-sized\" and then increased to the size of a blueberry and is now \"plum-sized\". She was started on Levaquin on 11/13. She has also been taking ibuprofen (most recent at 3pm) and takes oxycodone to sleep. She was seen earlier today and told to come back into the ED for incision and drainage. She is allergic to penicillins and and cephalosporins.     No history of abscesses. She denies cold symptoms or other current infections. She denies tobacco use or history of diabetes. No fever or chills. She denies any other complaints at this time. "     SHx: She leaves for a work trip tomorrow.         PAST MEDICAL HISTORY:  Past Medical History:   Diagnosis Date    NO ACTIVE PROBLEMS        PAST SURGICAL HISTORY:  Past Surgical History:   Procedure Laterality Date    ME LAP,ABDOMEN,ASPIRATE CYST      Description: Gynecologic Laparoscopy With Aspiration;  Proc Date: 2009;  Comments: with removal ovarian cysts    ZZC  DELIVERY ONLY      Description:  Section Low Transverse;  Proc Date: 2007;    ZZC LAP,CHOLECYSTECTOMY/EXPLORE      Description: Cholecystectomy Laparoscopic;  Proc Date: 2009;           CURRENT MEDICATIONS:    levofloxacin (LEVAQUIN) 500 MG tablet  norethindrone-ethinyl estradiol-iron (ESTROSTEP FE) 1-20/1-30/1-35 MG-MCG per tablet        ALLERGIES:  Allergies   Allergen Reactions    Pcn [Penicillins]     Tylenol [Acetaminophen]        FAMILY HISTORY:  No family history on file.    SOCIAL HISTORY:   Social History     Socioeconomic History    Marital status: Single     Spouse name: None    Number of children: None    Years of education: None    Highest education level: None   Tobacco Use    Smoking status: Never    Smokeless tobacco: Never     Social Drivers of Health     Financial Resource Strain: Low Risk  (2024)    [facility]    Financial Resource Strain     Difficulty of Paying Living Expenses: 3   Food Insecurity: No Food Insecurity (2024)    [facility]    Food Insecurity     Do you worry your food will run out before you are able to buy more?: 1   Transportation Needs: No Transportation Needs (2024)    Received from [employer] [facility]    Transportation Needs     Does lack of transportation keep you from medical appointments?: 1     Does lack of transportation keep you from work, meetings or getting things that you need?: 1   Social Connections: Socially Integrated (2024)  "   Received from [facility]    Social Connections     Do you often feel lonely or isolated from those around you?: 0   Housing Stability: Low Risk  (2/4/2024)    Received from [facility]    Housing Stability     What is your housing situation today?: 1       VITALS:  BP (!) 141/74   Pulse 97   Temp 98  F (36.7  C)   Resp 21   Ht 1.6 m (5' 3\")   Wt 145.2 kg (320 lb)   SpO2 91%   BMI 56.69 kg/m      PHYSICAL EXAM    Constitutional:  Alert, in no acute distress. Cooperative.  EYES: Conjunctivae clear.   HENT:  Atraumatic, normocephalic. Anterior subcutaneous focal fluctuance with overlying erythema as pictured below without obvious purulence. No surrounding palpable lymphadenopathy or extension into submandibular space. No trismus. Tolerates secretions. No nuchal rigidity. Full ROM neck without pain. Trachea midline with normal phonation.   Respiratory:  Respirations even, unlabored, in no acute respiratory distress. No cough. Speaks in full sentences easily.  Cardiovascular:  Regular rate, good peripheral perfusion. No peripheral edema. No chest wall tenderness.  GI: Soft, flat, non-distended.   Musculoskeletal:  No edema. No cyanosis. Range of motion major extremities intact.    Integument: Warm, Dry. Focal area of fluctuance vs induration anterior neck as pictured below with mild warmth. No obvious purulence or crepitus. No surrounding lymphadenopathy.  Neurologic:  Alert & oriented. No focal deficits noted.  Psych: Normal mood and affect.       today    11/13      LAB:  All pertinent labs reviewed and interpreted.  Results for orders placed or performed during the hospital encounter of 11/16/24   Soft tissue neck CT w contrast    Impression    IMPRESSION:   1.  Right anterior neck cellulitis with 3 cm superficial abscess at the level of the thyroid cartilage. No deep space extension.       RADIOLOGY:  Reviewed all pertinent imaging. Please " see official radiology report.  Soft tissue neck CT w contrast   Final Result   IMPRESSION:    1.  Right anterior neck cellulitis with 3 cm superficial abscess at the level of the thyroid cartilage. No deep space extension.        PROCEDURES:   PROCEDURE: Emergency Department Limited Bedside Screening Ultrasound   ANATOMICAL WINDOW: Subcutaneous    INDICATIONS: abscess   PROCEDURE PROVIDER: Serenity Montesinos PA-C   FINDINGS: Focal fluid collection with debris consistent with abscess   IMAGES PRINTED & SCANNED OR SAVED TO MEMORY: NO        PROCEDURE: Incision and Drainage   INDICATIONS: Localized abscess   PROCEDURE PROVIDER: Serenity Montesinos PA-C   SITE: Anterior neck   MEDICATION: 3 mLs of 1% Lidocaine with epinephrine   NOTE: The area was prepped with betadineand draped off in the usual sterile fashion.  Local anesthetic was injected subcutaneously with anesthesia effects demonstrated prior to proceeding.  The area of maximal fluctuance was opened with a # 11 Blade ([place]) using a Single Straight incision to allow for drainage.  The abscess was drained.  The abscess cavity was bluntly explored to separate any loculations. No Packing was placed into the abscess cavity.  A sterile dressing was placed over the area.   COMPLEXITY: Complex    Simple = single, furuncle, paronychia, superficial  Complex = multiple or abscess requiring probing, loculations, packing placement   COMPLICATIONS: Patient tolerated procedure well, without complication        I, Peng Bell, am serving as a scribe to document services personally performed by Serenity Montesinos PA-C based on my observation and the provider's statements to me. I, Serenity Montesinos PA-C, attest that Peng Bell is acting in a scribe capacity, has observed my performance of the services and has documented them in accordance with my direction.    Serenity Montesinos PA-C  [facility] EMERGENCY ROOM  [address]  [address]  [phone]      Serenity Montesinos PA-C  11/17/24 0158

## 2024-11-19 ENCOUNTER — TELEPHONE (OUTPATIENT)
Dept: OTOLARYNGOLOGY | Facility: CLINIC | Age: 39
End: 2024-11-19

## 2024-11-19 NOTE — TELEPHONE ENCOUNTER
" Health Call Center    Phone Message    May a detailed message be left on voicemail: yes     Reason for Call: Appointment Intake    Referring Provider Name: Serenity Montesinos PA-C   Diagnosis and/or Symptoms: subcutaneous abscess anterior neck     EMERGENCY REFERRAL-CYST IN NECK SIZE OF BLUEBERRY OR MARBLE FOR 15 YEARS. ALWAYS TOLD IT WAS BENIGN, AND COULD HAVE IT REMOVED BUT WAS JUST COSMETIC. ABOUT 1 WEEK AGO IT GOT VERY FIRM AND BECAME THE SIZE OF A PLUM AND BECAME RED AND \"ANGRY\". CT DONE. COULD SEE 2 CM ABSCESS. ABSCESS IS NOW 3 CM AND WAS DEEPER THAN ORIGINALLY THOUGHT.    Secure chat was sent- did not receive response- please review and contact pt. Thank you!     Action Taken: Message routed to:  [facility] ([facility] ENT    Travel Screening: Not Applicable                                                                        "

## 2024-11-19 NOTE — TELEPHONE ENCOUNTER
Left Voicemail (1st Attempt) for the patient to call back and schedule the following:    Appointment type: New ENT  Provider: Dr. Koch  Return date: Monday, 11/25 @ 8:40am (OK to double book)  Specialty phone number: [phone]  Additional appointment(s) needed: No  Additonal Notes: Ref by Serenity Montesinos for Subcutaneous abscess anterior neck

## 2024-11-21 NOTE — TELEPHONE ENCOUNTER
FUTURE VISIT INFORMATION      FUTURE VISIT INFORMATION:  Date: 11/25/24  Time: 5:40 AM  Location: [facility] - ENT  REFERRAL INFORMATION:  Referring provider:    Referring providers clinic:    Reason for visit/diagnosis:  Ref by Serenity Montesinos for Subcutaneous abscess anterior neck, okay's abiel Orantes in clinic  [facility] verified     RECORDS REQUESTED FROM      Clinic name Comments Records Status Imaging Status   [facility] 11/13/24 and 11/16/24 ER note Epic    [facility] Imaging 11/16/24 CT neck  11/13/24 CT neck Epic PACS

## 2024-11-24 ENCOUNTER — HEALTH MAINTENANCE LETTER (OUTPATIENT)
Age: 39
End: 2024-11-24

## 2024-11-25 ENCOUNTER — PRE VISIT (OUTPATIENT)
Dept: OTOLARYNGOLOGY | Facility: CLINIC | Age: 39
End: 2024-11-25

## 2024-11-25 ENCOUNTER — OFFICE VISIT (OUTPATIENT)
Dept: OTOLARYNGOLOGY | Facility: CLINIC | Age: 39
End: 2024-11-25

## 2024-11-25 VITALS
HEIGHT: 63 IN | WEIGHT: 293 LBS | OXYGEN SATURATION: 93 % | DIASTOLIC BLOOD PRESSURE: 113 MMHG | HEART RATE: 94 BPM | BODY MASS INDEX: 51.91 KG/M2 | SYSTOLIC BLOOD PRESSURE: 160 MMHG

## 2024-11-25 DIAGNOSIS — L02.11 ABSCESS OF NECK: ICD-10-CM

## 2024-11-25 PROCEDURE — 99204 OFFICE O/P NEW MOD 45 MIN: CPT | Performed by: STUDENT IN AN ORGANIZED HEALTH CARE EDUCATION/TRAINING PROGRAM

## 2024-11-25 RX ORDER — LOSARTAN POTASSIUM AND HYDROCHLOROTHIAZIDE 25; 100 MG/1; MG/1
1 TABLET ORAL DAILY
COMMUNITY
Start: 2024-09-15

## 2024-11-25 ASSESSMENT — PAIN SCALES - GENERAL: PAINLEVEL_OUTOF10: NO PAIN (0)

## 2024-11-25 NOTE — LETTER
2024       RE: Awilda Holley  [address]  [place] [address]     Dear Colleague,    Thank you for referring your patient, Awilda Holley, to the [facility] EAR NOSE AND THROAT CLINIC [place] [facility]. Please see a copy of my visit note below.    Head and neck surgery  24    At the pleasure meeting Mrs. Holley and her father today in clinic    She is a pleasant 39-year-old woman referred for evaluation of an anterior neck abscess.    A few weeks ago she developed swelling and pain in the anterior neck and was seen in the emergency department.  She was initially put on a course of oral antibiotics however this did not improve.  She represented the emergency department and CT scan showed a subcutaneous abscess.  Incision and drainage was performed on .    She initially had drainage but this is since resolved.  The pain and swelling has improved significantly.    She has no prior history of neck abscess    Past medical history:  None    Past surgical history:    Cholecystectomy    Medications:  Losartan hydrochlorothiazide    Allergies:  Penicillin    Social history:  Never smoker    Family history:      Physical examination:  Alert in no acute distress  No palpable cervical adenopathy  Induration of the anterior neck but no fluctuance.  No erythema  No lesions seen in the oral cavity or oropharynx    Assessment and plan:  39-year-old woman with an anterior subcutaneous neck abscess status post incision and drainage.  She has no prior history of infection in this area.  Her CT scan seems consistent with a subcutaneous abscess perhaps a furuncle.  She can follow-up with me as needed.    Say Koch MD    45 minutes spent on the date of the encounter in chart review, patient visit, review of tests, documentation and/or discussion with other providers about the issues documented above.        Again, thank you for allowing me to participate in the care of your patient.      Sincerely,    Say Koch MD

## 2024-11-25 NOTE — PATIENT INSTRUCTIONS
You were seen in the ENT Clinic today by Dr. Koch. If you have any questions or concerns after your appointment, please contact us (see below)    Please return to clinic as needed    How to Contact Us:  Send a [email or web address] message to your provider. Our team will respond to you via [email or web address]. Occasionally, we will need to call you to get further information.  For urgent matters (Monday-Friday), call the ENT Clinic: [phone] and speak with a call center team member - they will route your call appropriately.   If you'd like to speak directly with a nurse, please find our contact information below. We do our best to check voicemail frequently throughout the day, and will work to call you back within 1-2 days. For urgent matters, please use the general clinic phone numbers listed above.    Denise BARNARD RN, BSN  RN Care Coordinator, ENT Clinic  [facility]  Direct: [phone]

## 2024-12-03 NOTE — PROGRESS NOTES
Head and neck surgery  24    At the [facility] meeting Mrs. Holley and her father today in clinic    She is a pleasant 39-year-old woman referred for evaluation of an anterior neck abscess.    A few weeks ago she developed swelling and pain in the anterior neck and was seen in the emergency department.  She was initially put on a course of oral antibiotics however this did not improve.  She represented the emergency department and CT scan showed a subcutaneous abscess.  Incision and drainage was performed on .    She initially had drainage but this is since resolved.  The pain and swelling has improved significantly.    She has no prior history of neck abscess    Past medical history:  None    Past surgical history:    Cholecystectomy    Medications:  Losartan hydrochlorothiazide    Allergies:  Penicillin    Social history:  Never smoker    Family history:      Physical examination:  Alert in no acute distress  No palpable cervical adenopathy  Induration of the anterior neck but no fluctuance.  No erythema  No lesions seen in the oral cavity or oropharynx    Assessment and plan:  39-year-old woman with an anterior subcutaneous neck abscess status post incision and drainage.  She has no prior history of infection in this area.  Her CT scan seems consistent with a subcutaneous abscess perhaps a furuncle.  She can follow-up with me as needed.    Say Koch MD    45 minutes spent on the date of the encounter in chart review, patient visit, review of tests, documentation and/or discussion with other providers about the issues documented above.

## 2025-01-01 ENCOUNTER — APPOINTMENT (OUTPATIENT)
Dept: CT IMAGING | Facility: CLINIC | Age: 40
End: 2025-01-01
Attending: EMERGENCY MEDICINE

## 2025-01-01 ENCOUNTER — HOSPITAL ENCOUNTER (EMERGENCY)
Facility: CLINIC | Age: 40
Discharge: HOME | End: 2025-01-01
Attending: FAMILY MEDICINE

## 2025-01-01 VITALS
OXYGEN SATURATION: 97 % | DIASTOLIC BLOOD PRESSURE: 89 MMHG | HEART RATE: 98 BPM | TEMPERATURE: 98.1 F | BODY MASS INDEX: 55.27 KG/M2 | RESPIRATION RATE: 16 BRPM | WEIGHT: 293 LBS | SYSTOLIC BLOOD PRESSURE: 152 MMHG

## 2025-01-01 DIAGNOSIS — L03.211 FACIAL CELLULITIS: ICD-10-CM

## 2025-01-01 DIAGNOSIS — K04.7 DENTAL INFECTION: ICD-10-CM

## 2025-01-01 LAB
ANION GAP SERPL CALCULATED.3IONS-SCNC: 10 MMOL/L (ref 7–15)
BASOPHILS # BLD AUTO: 0 10E3/UL (ref 0–0.2)
BASOPHILS NFR BLD AUTO: 0 %
BUN SERPL-MCNC: 11.6 MG/DL (ref 6–20)
CALCIUM SERPL-MCNC: 9.5 MG/DL (ref 8.8–10.4)
CHLORIDE SERPL-SCNC: 96 MMOL/L (ref 98–107)
CREAT SERPL-MCNC: 0.55 MG/DL (ref 0.51–0.95)
EGFRCR SERPLBLD CKD-EPI 2021: >90 ML/MIN/1.73M2
EOSINOPHIL # BLD AUTO: 0.2 10E3/UL (ref 0–0.7)
EOSINOPHIL NFR BLD AUTO: 2 %
ERYTHROCYTE [DISTWIDTH] IN BLOOD BY AUTOMATED COUNT: 13.1 % (ref 10–15)
GLUCOSE SERPL-MCNC: 113 MG/DL (ref 70–99)
HCG SERPL QL: NEGATIVE
HCO3 SERPL-SCNC: 31 MMOL/L (ref 22–29)
HCT VFR BLD AUTO: 40.5 % (ref 35–47)
HGB BLD-MCNC: 13.4 G/DL (ref 11.7–15.7)
HOLD SPECIMEN: NORMAL
IMM GRANULOCYTES # BLD: 0 10E3/UL
IMM GRANULOCYTES NFR BLD: 0 %
LYMPHOCYTES # BLD AUTO: 2.1 10E3/UL (ref 0.8–5.3)
LYMPHOCYTES NFR BLD AUTO: 22 %
MCH RBC QN AUTO: 26.5 PG (ref 26.5–33)
MCHC RBC AUTO-ENTMCNC: 33.1 G/DL (ref 31.5–36.5)
MCV RBC AUTO: 80 FL (ref 78–100)
MONOCYTES # BLD AUTO: 0.6 10E3/UL (ref 0–1.3)
MONOCYTES NFR BLD AUTO: 6 %
NEUTROPHILS # BLD AUTO: 7 10E3/UL (ref 1.6–8.3)
NEUTROPHILS NFR BLD AUTO: 71 %
NRBC # BLD AUTO: 0 10E3/UL
NRBC BLD AUTO-RTO: 0 /100
PLATELET # BLD AUTO: 339 10E3/UL (ref 150–450)
POTASSIUM SERPL-SCNC: 4 MMOL/L (ref 3.4–5.3)
RBC # BLD AUTO: 5.06 10E6/UL (ref 3.8–5.2)
SODIUM SERPL-SCNC: 137 MMOL/L (ref 135–145)
WBC # BLD AUTO: 9.9 10E3/UL (ref 4–11)

## 2025-01-01 PROCEDURE — 70487 CT MAXILLOFACIAL W/DYE: CPT

## 2025-01-01 PROCEDURE — 93005 ELECTROCARDIOGRAM TRACING: CPT | Mod: RTG

## 2025-01-01 PROCEDURE — 99285 EMERGENCY DEPT VISIT HI MDM: CPT | Mod: 25 | Performed by: EMERGENCY MEDICINE

## 2025-01-01 PROCEDURE — 99284 EMERGENCY DEPT VISIT MOD MDM: CPT | Performed by: EMERGENCY MEDICINE

## 2025-01-01 PROCEDURE — 85025 COMPLETE CBC W/AUTO DIFF WBC: CPT | Performed by: EMERGENCY MEDICINE

## 2025-01-01 PROCEDURE — 36415 COLL VENOUS BLD VENIPUNCTURE: CPT | Performed by: EMERGENCY MEDICINE

## 2025-01-01 PROCEDURE — 250N000009 HC RX 250: Performed by: EMERGENCY MEDICINE

## 2025-01-01 PROCEDURE — 85048 AUTOMATED LEUKOCYTE COUNT: CPT | Performed by: EMERGENCY MEDICINE

## 2025-01-01 PROCEDURE — 87040 BLOOD CULTURE FOR BACTERIA: CPT | Performed by: EMERGENCY MEDICINE

## 2025-01-01 PROCEDURE — 96365 THER/PROPH/DIAG IV INF INIT: CPT | Mod: 59 | Performed by: EMERGENCY MEDICINE

## 2025-01-01 PROCEDURE — 84703 CHORIONIC GONADOTROPIN ASSAY: CPT | Performed by: EMERGENCY MEDICINE

## 2025-01-01 PROCEDURE — 250N000011 HC RX IP 250 OP 636: Performed by: EMERGENCY MEDICINE

## 2025-01-01 PROCEDURE — 80048 BASIC METABOLIC PNL TOTAL CA: CPT | Performed by: EMERGENCY MEDICINE

## 2025-01-01 RX ORDER — IOPAMIDOL 755 MG/ML
100 INJECTION, SOLUTION INTRAVASCULAR ONCE
Status: COMPLETED | OUTPATIENT
Start: 2025-01-01 | End: 2025-01-01

## 2025-01-01 RX ORDER — CLINDAMYCIN PHOSPHATE 600 MG/50ML
600 INJECTION, SOLUTION INTRAVENOUS ONCE
Status: COMPLETED | OUTPATIENT
Start: 2025-01-01 | End: 2025-01-01

## 2025-01-01 RX ORDER — CLINDAMYCIN HYDROCHLORIDE 300 MG/1
300 CAPSULE ORAL 4 TIMES DAILY
Qty: 28 CAPSULE | Refills: 0 | Status: SHIPPED | OUTPATIENT
Start: 2025-01-01 | End: 2025-01-01

## 2025-01-01 RX ORDER — CLINDAMYCIN HYDROCHLORIDE 300 MG/1
300 CAPSULE ORAL 4 TIMES DAILY
Qty: 28 CAPSULE | Refills: 0 | Status: SHIPPED | OUTPATIENT
Start: 2025-01-01

## 2025-01-01 RX ADMIN — SODIUM CHLORIDE 50 ML: 9 INJECTION, SOLUTION INTRAVENOUS at 17:17

## 2025-01-01 RX ADMIN — IOPAMIDOL 67 ML: 755 INJECTION, SOLUTION INTRAVENOUS at 17:15

## 2025-01-01 RX ADMIN — CLINDAMYCIN PHOSPHATE 600 MG: 600 INJECTION, SOLUTION INTRAVENOUS at 18:43

## 2025-01-01 ASSESSMENT — ACTIVITIES OF DAILY LIVING (ADL)
ADLS_ACUITY_SCORE: 41

## 2025-01-01 ASSESSMENT — COLUMBIA-SUICIDE SEVERITY RATING SCALE - C-SSRS
6. HAVE YOU EVER DONE ANYTHING, STARTED TO DO ANYTHING, OR PREPARED TO DO ANYTHING TO END YOUR LIFE?: NO
1. IN THE PAST MONTH, HAVE YOU WISHED YOU WERE DEAD OR WISHED YOU COULD GO TO SLEEP AND NOT WAKE UP?: NO
2. HAVE YOU ACTUALLY HAD ANY THOUGHTS OF KILLING YOURSELF IN THE PAST MONTH?: NO

## 2025-01-01 NOTE — ED TRIAGE NOTES
Triage Assessment (Adult)       Row Name 01/01/25 1521          Triage Assessment    Airway WDL WDL        Respiratory WDL    Respiratory WDL WDL        Skin Circulation/Temperature WDL    Skin Circulation/Temperature WDL WDL        Cardiac WDL    Cardiac WDL WDL        Peripheral/Neurovascular WDL    Peripheral Neurovascular WDL WDL        Cognitive/Neuro/Behavioral WDL    Cognitive/Neuro/Behavioral WDL WDL

## 2025-01-02 LAB
ATRIAL RATE - MUSE: 92 BPM
BACTERIA BLD CULT: NORMAL
BACTERIA BLD CULT: NORMAL
DIASTOLIC BLOOD PRESSURE - MUSE: NORMAL MMHG
INTERPRETATION ECG - MUSE: NORMAL
P AXIS - MUSE: 31 DEGREES
PR INTERVAL - MUSE: 140 MS
QRS DURATION - MUSE: 94 MS
QT - MUSE: 402 MS
QTC - MUSE: 497 MS
R AXIS - MUSE: 21 DEGREES
SYSTOLIC BLOOD PRESSURE - MUSE: NORMAL MMHG
T AXIS - MUSE: 51 DEGREES
VENTRICULAR RATE- MUSE: 92 BPM

## 2025-01-02 NOTE — DISCHARGE INSTRUCTIONS
A referral to dental urgent care has been provided for you.  Please call them in the morning at [phone] and speak with the  to confirm an urgent care follow-up appointment either tomorrow or Friday.  In the meantime fill the prescription for antibiotics and take as directed.  If you develop any new or worsening symptoms like nausea vomiting or cannot tolerate your oral antibiotics then please return to the emergency department.

## 2025-01-06 LAB
BACTERIA BLD CULT: NO GROWTH
BACTERIA BLD CULT: NO GROWTH

## 2025-03-13 ENCOUNTER — HOSPITAL ENCOUNTER (EMERGENCY)
Facility: CLINIC | Age: 40
Discharge: HOME | End: 2025-03-13
Attending: EMERGENCY MEDICINE
Payer: COMMERCIAL

## 2025-03-13 ENCOUNTER — ANCILLARY PROCEDURE (OUTPATIENT)
Dept: ULTRASOUND IMAGING | Facility: CLINIC | Age: 40
End: 2025-03-13
Attending: EMERGENCY MEDICINE
Payer: COMMERCIAL

## 2025-03-13 VITALS
HEART RATE: 88 BPM | TEMPERATURE: 97.4 F | BODY MASS INDEX: 51.91 KG/M2 | OXYGEN SATURATION: 95 % | HEIGHT: 63 IN | DIASTOLIC BLOOD PRESSURE: 97 MMHG | WEIGHT: 293 LBS | SYSTOLIC BLOOD PRESSURE: 181 MMHG | RESPIRATION RATE: 18 BRPM

## 2025-03-13 DIAGNOSIS — L02.11 ABSCESS OF SKIN OF NECK: ICD-10-CM

## 2025-03-13 PROCEDURE — 250N000013 HC RX MED GY IP 250 OP 250 PS 637: Performed by: EMERGENCY MEDICINE

## 2025-03-13 PROCEDURE — 10060 I&D ABSCESS SIMPLE/SINGLE: CPT

## 2025-03-13 PROCEDURE — 99284 EMERGENCY DEPT VISIT MOD MDM: CPT | Mod: 25

## 2025-03-13 PROCEDURE — 76536 US EXAM OF HEAD AND NECK: CPT

## 2025-03-13 PROCEDURE — 250N000013 HC RX MED GY IP 250 OP 250 PS 637

## 2025-03-13 RX ORDER — CEFDINIR 300 MG/1
300 CAPSULE ORAL 2 TIMES DAILY
Qty: 20 CAPSULE | Refills: 0 | Status: SHIPPED | OUTPATIENT
Start: 2025-03-13 | End: 2025-03-13

## 2025-03-13 RX ORDER — CEFDINIR 300 MG/1
300 CAPSULE ORAL ONCE
Status: COMPLETED | OUTPATIENT
Start: 2025-03-13 | End: 2025-03-13

## 2025-03-13 RX ORDER — CLINDAMYCIN HYDROCHLORIDE 150 MG/1
450 CAPSULE ORAL 3 TIMES DAILY
Qty: 90 CAPSULE | Refills: 0 | Status: SHIPPED | OUTPATIENT
Start: 2025-03-13 | End: 2025-03-13

## 2025-03-13 RX ORDER — CEFDINIR 300 MG/1
300 CAPSULE ORAL 2 TIMES DAILY
Qty: 20 CAPSULE | Refills: 0 | Status: SHIPPED | OUTPATIENT
Start: 2025-03-13

## 2025-03-13 RX ORDER — CLINDAMYCIN HYDROCHLORIDE 150 MG/1
450 CAPSULE ORAL 3 TIMES DAILY
Qty: 90 CAPSULE | Refills: 0 | Status: SHIPPED | OUTPATIENT
Start: 2025-03-13

## 2025-03-13 RX ORDER — CLINDAMYCIN HYDROCHLORIDE 150 MG/1
450 CAPSULE ORAL ONCE
Status: COMPLETED | OUTPATIENT
Start: 2025-03-13 | End: 2025-03-13

## 2025-03-13 RX ORDER — OXYCODONE HYDROCHLORIDE 5 MG/1
5 TABLET ORAL ONCE
Status: COMPLETED | OUTPATIENT
Start: 2025-03-13 | End: 2025-03-13

## 2025-03-13 RX ADMIN — OXYCODONE 5 MG: 5 TABLET ORAL at 19:24

## 2025-03-13 RX ADMIN — CEFDINIR 300 MG: 300 CAPSULE ORAL at 20:25

## 2025-03-13 RX ADMIN — CLINDAMYCIN HYDROCHLORIDE 450 MG: 150 CAPSULE ORAL at 19:23

## 2025-03-13 ASSESSMENT — COLUMBIA-SUICIDE SEVERITY RATING SCALE - C-SSRS
2. HAVE YOU ACTUALLY HAD ANY THOUGHTS OF KILLING YOURSELF IN THE PAST MONTH?: NO
6. HAVE YOU EVER DONE ANYTHING, STARTED TO DO ANYTHING, OR PREPARED TO DO ANYTHING TO END YOUR LIFE?: NO
1. IN THE PAST MONTH, HAVE YOU WISHED YOU WERE DEAD OR WISHED YOU COULD GO TO SLEEP AND NOT WAKE UP?: NO

## 2025-03-13 ASSESSMENT — ACTIVITIES OF DAILY LIVING (ADL): ADLS_ACUITY_SCORE: 41

## 2025-03-13 NOTE — ED PROVIDER NOTES
EMERGENCY DEPARTMENT ENCOUNTER      NAME: Awilda Holley  AGE: 39 year old female  YOB: 1985  MRN: [id number]  EVALUATION DATE & TIME: No admission date for patient encounter.    PCP: Ida Mace        Chief Complaint   Patient presents with    Wound Check         IMPRESSION  1. Abscess of skin of neck        PLAN  - cefdinir & clindamycin for home  - close PCP & dermatology follow up  - discharge to home    ED COURSE & MEDICAL DECISION MAKING         6:40 PM I was consulted by Gaby Doherty PA-C, on this patient. I reviewed ED presentation history, assessment, management, plan to this point. Please see ED MATTHEW note for details.    ***    This patient involved a high degree of complexity in medical decision making, as significant risks were present and assessed. Recent encounters & results in medical record reviewed by me.    All workup (i.e. any EKG/labs/imaging as per charting below) reviewed and independently interpreted by me. See respective sections for details.    Broad differential considered for this patient, including but not limited to:  ***    I had a face to face encounter with this patient seen by the Advanced Practice Provider (MATTHEW). I personally made/approved the management plan and take responsibility for the patient management. I personally saw patient and performed a substantive portion of the visit including all aspects of the medical decision making.    MEDICATIONS GIVEN IN THE EMERGENCY DEPARTMENT  Medications   clindamycin (CLEOCIN) capsule 450 mg (450 mg Oral $Given 3/13/25 1923)   cefdinir (OMNICEF) capsule 300 mg (300 mg Oral $Given 3/13/25 2025)   oxyCODONE (ROXICODONE) tablet 5 mg (5 mg Oral $Given 3/13/25 1924)       ***delete below if patient admitted***    NEW PRESCRIPTIONS STARTED AT TODAY'S ER VISIT  Discharge Medication List as of 3/13/2025  8:23 PM        CONTINUE these medications which have CHANGED    Details   cefdinir (OMNICEF) 300 MG capsule Take 1  "capsule (300 mg) by mouth 2 times daily., Disp-20 capsule, R-0, Local Print      clindamycin (CLEOCIN) 150 MG capsule Take 3 capsules (450 mg) by mouth 3 times daily., Disp-90 capsule, R-0, Local Print           CONTINUE these medications which have NOT CHANGED    Details   losartan-hydrochlorothiazide (HYZAAR) 100-25 MG tablet Take 1 tablet by mouth daily., Historical      norethindrone-ethinyl estradiol-iron (ESTROSTEP FE) 1-20/1-30/1-35 MG-MCG per tablet Take 1 tablet by mouth daily, Historical                 =================================================================      HPI  Use of : N/A         Awilda Holley is a 39 year old female with a pertinent history of anxiety and depression who presents to the ED by walking for evaluation of an abscess on her neck.     Per patient, she has an abscess on her neck. 15 years ago she developed a skin tag on the same spot she has the abscess. Then in November of 2024 the skin tag \"turned into a painful abscess.\" Thus she was seen for this and had it drained. After this she was put on antibiotics. After it was drained she went to ENT for follow-up and they told her nothing else had to be done. She noted that the abscess never fully went away. Two days ago she noticed that it started getting bigger. Yesterday/today (03/12/2025-03/13/2025) it doubled in size. It is causes her pain especially when moving her head. She has been using ice and ibuprofen for the pain. She last took ibuprofen at 1:00 PM. It was noted that she had a few episodes where she felt clammy/hot today, but she had a normal temperature.          --------------- MEDICAL HISTORY ---------------  PAST MEDICAL HISTORY:  Reviewed by me.  Past Medical History:   Diagnosis Date    NO ACTIVE PROBLEMS      Patient Active Problem List   Diagnosis    Non-neoplastic Nevus    Contusion With Intact Skin Surface    Lower Back Pain    Allergic Rhinitis    Mild Recurrent Major Depression    Fatigue    " Donis    Obesity    Carpal Tunnel Syndrome    Acute Sinusitis    Vitamin D Deficiency    Generalized Anxiety Disorder    Blood Pressure Isolated Elevated    Endometriosis    Cubital tunnel syndrome       PAST SURGICAL HISTORY:  Reviewed by me.  Past Surgical History:   Procedure Laterality Date    [facility] LAP,ABDOMEN,ASPIRATE CYST      Description: Gynecologic Laparoscopy With Aspiration;  Proc Date: 2009;  Comments: with removal ovarian cysts    [facility]  DELIVERY ONLY      Description:  Section Low Transverse;  Proc Date: 2007;    [facility] LAP,CHOLECYSTECTOMY/EXPLORE      Description: Cholecystectomy Laparoscopic;  Proc Date: 2009;       CURRENT MEDICATIONS:    Reviewed by me.  No current facility-administered medications for this encounter.    Current Outpatient Medications:     cefdinir (OMNICEF) 300 MG capsule, Take 1 capsule (300 mg) by mouth 2 times daily., Disp: 20 capsule, Rfl: 0    clindamycin (CLEOCIN) 150 MG capsule, Take 3 capsules (450 mg) by mouth 3 times daily., Disp: 90 capsule, Rfl: 0    losartan-hydrochlorothiazide (HYZAAR) 100-25 MG tablet, Take 1 tablet by mouth daily., Disp: , Rfl:     norethindrone-ethinyl estradiol-iron (ESTROSTEP FE) 1-20/1-30/1-35 MG-MCG per tablet, Take 1 tablet by mouth daily, Disp: , Rfl:     ALLERGIES:  Reviewed by me.  Allergies   Allergen Reactions    Pcn [Penicillins] Hives    Tylenol [Acetaminophen]        FAMILY HISTORY:  Reviewed by me.  No family history on file.  ***    SOCIAL HISTORY:   Reviewed by me.  Social History     Socioeconomic History    Marital status: Single   Tobacco Use    Smoking status: Never    Smokeless tobacco: Never     Social Drivers of Health     Financial Resource Strain: Low Risk  (2024)    Received from [facility]    Financial Resource Strain     Difficulty of Paying Living Expenses: 3   Food Insecurity: No Food Insecurity (2024)    Received from [facility]  [facility]    Food Insecurity     Do you worry your food will run out before you are able to buy more?: 1   Transportation Needs: No Transportation Needs (2/4/2024)    Received from [facility] [address] [facility]    Transportation Needs     Does lack of transportation keep you from medical appointments?: 1     Does lack of transportation keep you from work, meetings or getting things that you need?: 1   Social Connections: Socially Integrated (2/4/2024)    Received from [facility] [address] [facility]    Social Connections     Do you often feel lonely or isolated from those around you?: 0   Housing Stability: Low Risk  (2/4/2024)    Received from [facility] [address] [facility]    Housing Stability     What is your housing situation today?: 1         --------------- PHYSICAL EXAM ---------------  Nursing notes and vitals independently reviewed by me.  VITALS:  Vitals:    03/13/25 1708 03/13/25 1709   BP: (!) 181/97    Pulse: 88    Resp: 18    Temp:  97.4  F (36.3  C)   TempSrc: Temporal Oral   SpO2: 95%    Weight: (!) 145.2 kg (320 lb)    Height: 1.6 m (5' 3\")        PHYSICAL EXAM:    General:  alert, interactive, no distress  Eyes:  conjunctivae clear, conjugate gaze  HENT:  atraumatic, nose with no rhinorrhea, oropharynx clear  Neck:  no meningismus  Cardiovascular:  HR *** during exam, regular*** rhythm, no murmurs, brisk cap refill  Chest:  no chest wall tenderness  Pulmonary:  no stridor, normal phonation, normal work of breathing, clear lungs bilaterally  Abdomen: soft, nondistended, nontender  :  no CVA tenderness  Back:  no midline spinal tenderness  Musculoskeletal:  no pretibial edema, no calf tenderness. Gross ROM intact to joints of extremities with no obvious deformities.  Skin:  warm, dry, no rash  Neuro:  awake, alert, answers questions appropriately, follows commands, moves all limbs  Psych:  calm, normal affect      --------------- RESULTS " "---------------  EKG:    Reviewed and independently interpreted by me.  - ***  - ***  My read.    LAB:  Reviewed and independently interpreted by me.  Results for orders placed or performed during the hospital encounter of 03/13/25   POC US SOFT TISSUE    Impression    Obtained due to:  skin pain/redness/swelling over anterior neck  Verbal consent given by:  patient    Subcutaneous:  - large fluid collection with no vascular flow; no deep tracking; consistent with subcutaneous abscess      Independently performed, reviewed, & interpreted by me. Images saved to PACS.    Chapo Nino MD  Emergency Medicine  3/13/2025  [facility] EMERGENCY ROOM  [address] [place], [place] [id number]  [phone]     ***    RADIOLOGY:  Reviewed and independently interpreted by me. Please see official radiology report.  Recent Results (from the past 24 hours)   POC US SOFT TISSUE    Impression    Obtained due to:  skin pain/redness/swelling over anterior neck  Verbal consent given by:  patient    Subcutaneous:  - large fluid collection with no vascular flow; no deep tracking; consistent with subcutaneous abscess      Independently performed, reviewed, & interpreted by me. Images saved to PACS.    Chapo Nino MD  Emergency Medicine  3/13/2025  [facility] EMERGENCY ROOM  [address] [place] [id number]  [phone]     ***    PROCEDURES:   I was present for the key portions of procedures documented in MATTHEW/midlevel note, see midlevel note for further details. See additional below as well.  Procedures   ***critical care***  ***              --------------- ADDITIONAL MDM ---------------  Sepsis/STEMI/Stroke Measures:  {Sepsis/Stemi/Stroke:238081::\"None\"}    MIPS:  {[facility] DOCUMENTATION:186122}    History:  - I considered systemic symptoms of the presenting illness.  - Supplemental history from:       -- patient***, family***, EMS***  - External " Record(s) reviewed:       -- Inpatient/outpatient record (***), prior labs (***), prior imaging (***)       -- see above ED course & MDM for further details    Workup:  - Chart documentation above includes differential considered and my independent interpretation any EKGs, labs tests, and/or imaging  - emergent/severe conditions considered and evaluated for: see above differential & MDM***  - medications given that require intensive monitoring for toxicity: ***  - In additional to work up documented, I considered the following work up:       -- ***       -- see above ED course & MDM for further details    External Consultation:  - Discussion of management with another provider:       -- ED pharmacist re: meds       -- see above charting for additional    Complicating Factors:  - Care impacted by chronic illness:       -- see above MDM, past medical history, & problem list    Disposition Considerations:  - Discharge***       -- I considered escalation of care with admission to the hospital, but ultimately discharged the patient given ***       -- I recommended the patient continue their current prescription strength medication(s) as charted above in current medications list***       -- I prescribed prescription strength medication(s) as charted above***       -- I considered prescription pain*** medication, comfortable without this***       -- I recommended over-the-counter medication(s) as charted above & in discharge instructions***    - Admit***    - Admission considered. Patient was signed out to the oncoming physician, disposition pending.***           I, Emma Aldridge, am serving as a scribe to document services personally performed by Dr. Chapo Nino based on my observation and the provider's statements to me. I, Chapo Nino MD attest that Emma Aldridge is acting in a scribe capacity, has observed my performance of the services and has documented them in accordance with my direction.      Chapo Nino  MD  03/13/25  Emergency Medicine  [facility] EMERGENCY ROOM  [address] [facility] [id number] [phone]  Dept: [phone]

## 2025-03-13 NOTE — ED TRIAGE NOTES
PT complaints of a abscess on her neck that she states has been there for years. She states she last had it drained and evaluated by ENT back in November of 2024. She states she developed additional swelling and pain two days ago called ENT and they sent her here.

## 2025-03-13 NOTE — ED PROVIDER NOTES
Emergency Department Encounter   NAME: Awilda Holley ; AGE: 39 year old female ; YOB: 1985 ; MRN: [id number] ; PCP: Ida Mace   ED PROVIDER: Gaby Doherty PA-C    Evaluation Date & Time:   No admission date for patient encounter.    CHIEF COMPLAINT:  Wound Check      Impression and Plan   MDM: Awilda Holley is a 39 year old female who presents to the ED for evaluation of ***.     Patient is vitally normal ***.  Physical exam is significant for ***.  Differential diagnosis includes ***.     I independently reviewed and interpreted all labs and imaging;  Labs show ***.  EKG shows ***.   ***CT/XR/US and see ***    On reevaluation, ***.    Consulted *** who recommends ***.     Return precautions to the ED were discussed, *** verbalized understanding and were agreeable with the plan. All questions were answered.     Per chart review:  -Per Chart Review, the patient was seen on 11/25/2024 at [facility] for a referred evaluation after she had an anterior neck abscess. She had been seen in the emergency department for this and was put on oral antibiotics but had not improvement. Thus she went back to the emergency department and CT showed a subcutaneous abscess so an incision and drainage was done on 11/16/2024. No new plans were made at this visit to address the abscess and she was told to follow-up if needed.   - Recent labs and imaging reviewed  - Care everywhere reviewed    Medical Decision Making  Obtained supplemental history:Supplemental history obtained?: No  Reviewed external records: External records reviewed?: Documented in chart and Outpatient Record: 11/25/2024 visit with [facility]  Care impacted by chronic illness:Documented in Chart and Mental Health  Did you consider but not order tests?: Work up considered but not performed and documented in chart, if applicable  Did you interpret  "images independently?: Independent interpretation of ECG and images noted in documentation, when applicable.  Consultation discussion with other provider:Did you involve another provider (consultant, , pharmacy, etc.)?: I discussed the care with another health care provider, see documentation for details.  Discharge. {zvprescriptionmeds:290343}. {zvadmissionconsidered:818118::\"See documentation for any additional details\"}.    MIPS:  {[facility] MIPS DOCUMENTATION:058106}    ED COURSE:  6:18 PM I met and introduced myself to the patient. I gathered initial history and performed my physical exam. We discussed plan for initial workup.   6:39 PM I have staffed the patient with Dr. Nino, ED MD, who has evaluated the patient and agrees with all aspects of today's care.   7:26 PM I rechecked the patient and discussed results, discharge, follow up, and reasons to return to the ED.       FINAL IMPRESSION:  No diagnosis found.      MEDICATIONS GIVEN IN THE EMERGENCY DEPARTMENT:  Medications - No data to display      NEW PRESCRIPTIONS STARTED AT TODAY'S ED VISIT:  New Prescriptions    No medications on file         HPI   Use of Intrepreter: N/A    Awilda Holley is a 39 year old female with a pertinent history of anxiety and depression who presents to the ED by walking for evaluation of an abscess on her neck.    Per patient, she has an abscess on her neck. 15 years ago she developed a skin tag on the same spot she has the abscess. Then in November of 2024 the skin tag \"turned into a painful abscess.\" Thus she was seen for this and had it drained. After the drainage she was put on antibiotics (exact antibiotic name not mentioned). After it was drained she went to ENT for follow-up and they told her nothing else had to be done. She noted that the abscess never fully went away. Two days ago she noticed that it started getting bigger. Yesterday/today (03/12/2025-03/13/2025) it doubled in size. It is causes her pain especially " "when moving her head. She has been using ice and ibuprofen for the pain. She last took ibuprofen at 1:00 PM. It was noted that she had a few episodes where she felt clammy/hot today, but she had a normal temperature.      She has had no issues speaking/swallowing, shortness of breath, nausea, or vomiting. No daily medications were mentioned.    REVIEW OF SYSTEMS:  Pertinent positive and negative symptoms per HPI.       Medical History     Past Medical History:   Diagnosis Date    NO ACTIVE PROBLEMS        Past Surgical History:   Procedure Laterality Date    [facility] LAP,ABDOMEN,ASPIRATE CYST      Description: Gynecologic Laparoscopy With Aspiration;  Proc Date: 2009;  Comments: with removal ovarian cysts    [facility]  DELIVERY ONLY      Description:  Section Low Transverse;  Proc Date: 2007;    [facility] LAP,CHOLECYSTECTOMY/EXPLORE      Description: Cholecystectomy Laparoscopic;  Proc Date: 2009;       No family history on file.    Social History     Tobacco Use    Smoking status: Never    Smokeless tobacco: Never       clindamycin (CLEOCIN) 300 MG capsule  losartan-hydrochlorothiazide (HYZAAR) 100-25 MG tablet  norethindrone-ethinyl estradiol-iron (ESTROSTEP FE) 1-20/1-30/1-35 MG-MCG per tablet          Physical Exam     First Vitals:  Patient Vitals for the past 24 hrs:   BP Temp Temp src Pulse Resp SpO2 Height Weight   25 1709 -- 97.4  F (36.3  C) Oral -- -- -- -- --   25 1708 (!) 181/97 -- Temporal 88 18 95 % 1.6 m (5' 3\") (!) 145.2 kg (320 lb)         PHYSICAL EXAM: ***  Physical Exam        Results     LAB:  All pertinent labs reviewed and interpreted  Labs Ordered and Resulted from Time of ED Arrival to Time of ED Departure - No data to display    RADIOLOGY:  No orders to display         ECG:  ***  Performed at: ***    Impression: ***    Rate: ***  Rhythm: ***  Axis: ***  MA Interval: ***  QRS Interval: ***  QTc Interval: ***  ST Changes: ***  Comparison: ***    EKG results " "reviewed and interpreted by  ***, ED MD.     PROCEDURES:  PROCEDURE: Incision and Drainage   INDICATIONS: Localized abscess   PROCEDURE PROVIDER: Gaby Doherty PA-C   SITE: ***   MEDICATION: *** mLs of {Local Anesthetic:054239}   NOTE: The area was prepped with ***chlorhexidine and draped off in the usual sterile fashion.  Local anesthetic was injected subcutaneously with anesthesia effects demonstrated prior to proceeding.  The area of maximal fluctuance was opened with a {Blade Selection:868291} using {Incision Type:967848} incision to allow for drainage.  The abscess was drained.  The abscess cavity was bluntly explored to separate any loculations. {Wound Packin} was placed into the abscess cavity.  A sterile dressing was placed over the area.   COMPLEXITY: {Simple/Complex:166083}    Simple = single, furuncle, paronychia, superficial  Complex = multiple or abscess requiring probing, loculations, packing placement   COMPLICATIONS: {Procedure Tolerance:469601::\"Patient tolerated procedure well, without complication\"}          I, Bryant Urbina, am serving as a scribe to document services personally performed by Gaby Doherty PA-C, based on my observation and the provider's statements to me. I, Gaby Doherty PA-C attest that Bryant Urbina is acting in a scribe capacity, has observed my performance of the services and has documented them in accordance with my direction.       Gaby Doherty PA-C   Emergency Medicine   [facility] EMERGENCY ROOM   " Abdomen is flat.      Palpations: There is no mass.      Tenderness: There is no abdominal tenderness. There is no guarding.   Musculoskeletal:      Cervical back: Normal range of motion and neck supple. Erythema (abscess at anterior neck) present. No rigidity. No spinous process tenderness or muscular tenderness.   Lymphadenopathy:      Cervical: No cervical adenopathy.   Skin:     General: Skin is warm.      Capillary Refill: Capillary refill takes less than 2 seconds.   Neurological:      General: No focal deficit present.      Mental Status: She is alert and oriented to person, place, and time.   Psychiatric:         Mood and Affect: Mood normal.         Behavior: Behavior normal.             Results     LAB:  All pertinent labs reviewed and interpreted  Labs Ordered and Resulted from Time of ED Arrival to Time of ED Departure - No data to display    RADIOLOGY:  POC US SOFT TISSUE   ED Interpretation   Obtained due to:  skin pain/redness/swelling over anterior neck  Verbal consent given by:  patient    Subcutaneous:  - large fluid collection with no vascular flow; no deep tracking; consistent with subcutaneous abscess      Independently performed, reviewed, & interpreted by me. Images saved to PACS.    Chapo Nino MD  Emergency Medicine  3/13/2025  [facility] EMERGENCY ROOM  [facility] [facility] [address] [phone]      Final Result            ECG:  None    PROCEDURES:  PROCEDURE: Incision and Drainage   INDICATIONS: Localized abscess   PROCEDURE PROVIDER: Gaby Doherty PA-C   SITE: Anterior neck   MEDICATION: 5 mLs of 1% Lidocaine with epinephrine   NOTE: The area was prepped with chlorhexidine and draped off in the usual sterile fashion.  Local anesthetic was injected subcutaneously with anesthesia effects demonstrated prior to proceeding.  The area of maximal fluctuance was opened with a # 11 Blade ([place]) using a Single Straight incision to allow  for drainage.  The abscess was drained.  The abscess cavity was bluntly explored to separate any loculations. No Packing was placed into the abscess cavity.  A sterile dressing was placed over the area.   COMPLEXITY: Complex    Simple = single, furuncle, paronychia, superficial  Complex = multiple or abscess requiring probing, loculations, packing placement   COMPLICATIONS: Patient tolerated procedure well, without complication          I, Bryant Urbina, am serving as a scribe to document services personally performed by Gaby Doherty PA-C, based on my observation and the provider's statements to me. I, Gaby Doherty PA-C attest that Bryant Urbina is acting in a scribe capacity, has observed my performance of the services and has documented them in accordance with my direction.       Gaby Doherty PA-C   Emergency Medicine   [facility] EMERGENCY ROOM      Gaby Doherty PA-C  03/14/25 0034

## 2025-03-14 NOTE — DISCHARGE INSTRUCTIONS
You were started on two antibiotics, take these as prescribed for the next 10 days. A dermatology referral was placed for you, they should call you to schedule an appointment in the next couple of days. The number for [facility] is also in your paperwork to call as needed.     Apply warm compresses to the area twice a day and massage the area to continue to encourage drainage. Follow up with your PCP.

## 2025-03-17 ENCOUNTER — TELEPHONE (OUTPATIENT)
Dept: DERMATOLOGY | Facility: CLINIC | Age: 40
End: 2025-03-17
Payer: COMMERCIAL

## 2025-03-17 NOTE — TELEPHONE ENCOUNTER
Patient Contact    Attempt # 1    Was call answered?  No    Left message on answering machine for patient to call back.    Kiana ESPARZA RN  Dermatology   [phone]

## 2025-03-17 NOTE — TELEPHONE ENCOUNTER
[facility] Call Center    Phone Message    May a detailed message be left on voicemail: yes     Reason for Call: Appointment Intake    Referring Provider Name: Chapo Nino MD @ [facility] Emergency Room  Diagnosis and/or Symptoms: Abscess of skin of neck [L02.11]  Additional Information: recurrent anterior neck abscess/cyst   Urgent: 3-5 Days    Patient confirmed she would like [facility] for her hospital follow up. Routing to clinic per protocols.     Action Taken: Message routed to:  Other: [facility] Triage    Travel Screening: Not Applicable     Date of Service:

## 2025-05-12 ENCOUNTER — TELEPHONE (OUTPATIENT)
Dept: DERMATOLOGY | Facility: CLINIC | Age: 40
End: 2025-05-12

## 2025-05-12 NOTE — TELEPHONE ENCOUNTER
Patient Contact    Attempt # 1    Was call answered?  Yes    Called and spoke with patient, she had another call coming in that she needed to take. Told pt she can call us back to reschedule excision.    Sailaja Islas MA    [facility]   [phone]

## 2025-05-12 NOTE — TELEPHONE ENCOUNTER
[facility] Call Center    Phone Message    May a detailed message be left on voicemail: no     Reason for Call: Other: Pt, Awilda - Tested Positive for Covid- Needs to cancel 5/12 appt and reschedule .  Call [phone]     Action Taken: Other: [place] [facility]    Travel Screening: Not Applicable     Date of Service: 5/12